# Patient Record
Sex: FEMALE | Race: WHITE | NOT HISPANIC OR LATINO | Employment: FULL TIME | URBAN - METROPOLITAN AREA
[De-identification: names, ages, dates, MRNs, and addresses within clinical notes are randomized per-mention and may not be internally consistent; named-entity substitution may affect disease eponyms.]

---

## 2023-10-27 ENCOUNTER — TELEPHONE (OUTPATIENT)
Dept: TRANSPLANT | Facility: HOSPITAL | Age: 43
End: 2023-10-27
Payer: COMMERCIAL

## 2023-10-27 NOTE — TELEPHONE ENCOUNTER
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 BRCA neg. In remission since.  Reviewed with Dr. Bella fine to start eval process.  Call placed to Bria but went straight to voice mail. Message left requesting call back.  Joaquin to schedule for virtual living donor education.

## 2023-11-03 ENCOUNTER — TELEPHONE (OUTPATIENT)
Dept: TRANSPLANT | Facility: HOSPITAL | Age: 43
End: 2023-11-03
Payer: COMMERCIAL

## 2023-11-06 ENCOUNTER — EDUCATION (OUTPATIENT)
Dept: TRANSPLANT | Facility: HOSPITAL | Age: 43
End: 2023-11-06
Payer: COMMERCIAL

## 2023-11-06 ENCOUNTER — DOCUMENTATION (OUTPATIENT)
Dept: TRANSPLANT | Facility: HOSPITAL | Age: 43
End: 2023-11-06

## 2023-11-06 DIAGNOSIS — Z52.4 DONOR OF KIDNEY FOR TRANSPLANT: Primary | ICD-10-CM

## 2023-11-06 NOTE — PROGRESS NOTES
Patient received education regarding the following topics as part of their living donor evaluation:  The evaluation process, including:  ·     Transplant team members and roles   ·     Required consultations and testing  ·     Selection criteria and suitability for donation  ·     Psychosocial and financial considerations for a successful transplant  ·     Patient responsibilities, including the necessity of adhering to a strict medical regimen  An overview of the surgical procedure   Potential medical, surgical, and psychosocial risks to transplantation, including:  ·     Wound infection  ·     Pneumonia  ·     Blood clot formation  ·     Complications with remaining kidney including kidney failure and need for dialysis or kidney transplant  ·     Arrhythmias and cardiovascular collapse  ·     Multi-organ system failure  ·     Death  ·     Depression  ·     Post-Traumatic Stress Disorder  ·     Generalized anxiety, issues of dependence, and feelings of guilt  Available alternatives to transplantation  National and Transplant [place] outcomes for one-year patient and graft-survival from the most recent [facility] program-specific report.   Donor risk factors that could affect the success of the transplant and the health of the patient, including:  ·     Donor age  ·     Donor medical and social history  ·     Condition of the organ  ·     Risk of jorge cancer, HIV, Hepatitis B, Hepatitis C, or malaria if the infection is not detectable at the time of donation  Patient's right to withdraw consent for donation at any time during the process. Patient's consent to donate willingly and without pressure (coercion) from anyone, and will not receive payment or financial reward for donating organ.  Transplants not performed in a Medicare-approved transplant center could affect the patient's ability to have immunosuppression medication paid for under Medicare part B.   Multiple listing options.     Patient was given the  opportunity to have questions answered and understands that the living donor team will be available to assist the patient throughout the entire donation process. Patient was provided a copy of the signed informed consent for living donor donation.     Signed evaluation informed consent received? 11/6/2023

## 2023-11-10 ENCOUNTER — TELEPHONE (OUTPATIENT)
Dept: TRANSPLANT | Facility: HOSPITAL | Age: 43
End: 2023-11-10
Payer: COMMERCIAL

## 2023-11-17 ENCOUNTER — LAB (OUTPATIENT)
Dept: LAB | Facility: LAB | Age: 43
End: 2023-11-17
Payer: COMMERCIAL

## 2023-11-17 DIAGNOSIS — Z52.4 DONOR OF KIDNEY FOR TRANSPLANT: ICD-10-CM

## 2023-11-17 LAB
ABO GROUP (TYPE) IN BLOOD: NORMAL
ALBUMIN (MG/24HR) IN 24 HOUR URINE: NORMAL
ALBUMIN SERPL BCP-MCNC: 4.1 G/DL (ref 3.4–5)
ALP SERPL-CCNC: 47 U/L (ref 33–110)
ALT SERPL W P-5'-P-CCNC: 9 U/L (ref 7–45)
AMPHETAMINES UR QL SCN: NORMAL
ANION GAP SERPL CALC-SCNC: 9 MMOL/L (ref 10–20)
APPEARANCE UR: CLEAR
APTT PPP: 30 SECONDS (ref 27–38)
AST SERPL W P-5'-P-CCNC: 13 U/L (ref 9–39)
BARBITURATES UR QL SCN: NORMAL
BENZODIAZ UR QL SCN: NORMAL
BILIRUB SERPL-MCNC: 0.8 MG/DL (ref 0–1.2)
BILIRUB UR STRIP.AUTO-MCNC: NEGATIVE MG/DL
BUN SERPL-MCNC: 15 MG/DL (ref 6–23)
BZE UR QL SCN: NORMAL
CALCIUM SERPL-MCNC: 8.9 MG/DL (ref 8.6–10.3)
CANNABINOIDS UR QL SCN: NORMAL
CHLORIDE SERPL-SCNC: 107 MMOL/L (ref 98–107)
CHOLEST SERPL-MCNC: 170 MG/DL (ref 0–199)
CHOLESTEROL/HDL RATIO: 2.8
CMV IGG AVIDITY SERPL IA-RTO: REACTIVE %
CO2 SERPL-SCNC: 28 MMOL/L (ref 21–32)
COLLECT DURATION TIME SPEC: 24 HRS
COLOR UR: YELLOW
CREAT 24H UR-MCNC: 90.1 MG/DL (ref 20–320)
CREAT 24H UR-MRATE: 1.4 G/24 H (ref 0.67–1.59)
CREAT CL 24H UR+SERPL-VRATE: 133 ML/MIN
CREAT SERPL-MCNC: 0.73 MG/DL (ref 0.5–1.05)
CREAT SERPL-MCNC: 0.73 MG/DL (ref 0.5–1.05)
CREAT UR-MCNC: 183 MG/DL (ref 20–320)
CREAT UR-MCNC: 183 MG/DL (ref 20–320)
EBV EA IGG SER QL: NEGATIVE
EBV NA AB SER QL: POSITIVE
EBV VCA IGG SER IA-ACNC: POSITIVE
EBV VCA IGM SER IA-ACNC: ABNORMAL
ERYTHROCYTE [DISTWIDTH] IN BLOOD BY AUTOMATED COUNT: 11.9 % (ref 11.5–14.5)
EST. AVERAGE GLUCOSE BLD GHB EST-MCNC: 91 MG/DL
FENTANYL+NORFENTANYL UR QL SCN: NORMAL
GFR SERPL CREATININE-BSD FRML MDRD: >90 ML/MIN/1.73M*2
GFR SERPL CREATININE-BSD FRML MDRD: >90 ML/MIN/1.73M*2
GLUCOSE P FAST SERPL-MCNC: 88 MG/DL (ref 74–99)
GLUCOSE SERPL-MCNC: 88 MG/DL (ref 74–99)
GLUCOSE UR STRIP.AUTO-MCNC: NEGATIVE MG/DL
HBA1C MFR BLD: 4.8 %
HBV CORE IGM SER QL: NONREACTIVE
HBV SURFACE AB SER-ACNC: 5.4 MIU/ML
HBV SURFACE AG SERPL QL IA: NONREACTIVE
HCG UR QL IA.RAPID: NEGATIVE
HCT VFR BLD AUTO: 43.6 % (ref 36–46)
HCV AB SER QL: NONREACTIVE
HDLC SERPL-MCNC: 61.4 MG/DL
HGB BLD-MCNC: 14.2 G/DL (ref 12–16)
HIV 1+2 AB+HIV1 P24 AG SERPL QL IA: NONREACTIVE
HOLD SPECIMEN: NORMAL
INR PPP: 1 (ref 0.9–1.1)
KETONES UR STRIP.AUTO-MCNC: NEGATIVE MG/DL
LDLC SERPL CALC-MCNC: 98 MG/DL
LEUKOCYTE ESTERASE UR QL STRIP.AUTO: NEGATIVE
MCH RBC QN AUTO: 31.8 PG (ref 26–34)
MCHC RBC AUTO-ENTMCNC: 32.6 G/DL (ref 32–36)
MCV RBC AUTO: 98 FL (ref 80–100)
MICROALBUMIN PANEL 24H UR: <7 MG/L
MICROALBUMIN UR-MCNC: <7 MG/L
MICROALBUMIN/CREAT UR: NORMAL MG/G{CREAT}
NITRITE UR QL STRIP.AUTO: NEGATIVE
NON HDL CHOLESTEROL: 109 MG/DL (ref 0–149)
NRBC BLD-RTO: 0 /100 WBCS (ref 0–0)
OPIATES UR QL SCN: NORMAL
OXYCODONE+OXYMORPHONE UR QL SCN: NORMAL
PCP UR QL SCN: NORMAL
PH UR STRIP.AUTO: 5 [PH]
PHOSPHATE SERPL-MCNC: 3.3 MG/DL (ref 2.5–4.9)
PLATELET # BLD AUTO: 265 X10*3/UL (ref 150–450)
POTASSIUM SERPL-SCNC: 4 MMOL/L (ref 3.5–5.3)
PROT 24H UR-MCNC: 6 MG/DL (ref 5–24)
PROT 24H UR-MRATE: 93 MG/24H (ref 0–149)
PROT SERPL-MCNC: 6.7 G/DL (ref 6.4–8.2)
PROT UR STRIP.AUTO-MCNC: NEGATIVE MG/DL
PROT UR-ACNC: 9 MG/DL (ref 5–24)
PROT/CREAT UR: 0.05 MG/MG CREAT (ref 0–0.17)
PROTHROMBIN TIME: 11.3 SECONDS (ref 9.8–12.8)
RBC # BLD AUTO: 4.46 X10*6/UL (ref 4–5.2)
RBC # UR STRIP.AUTO: NEGATIVE /UL
RH FACTOR (ANTIGEN D): NORMAL
SODIUM SERPL-SCNC: 140 MMOL/L (ref 136–145)
SP GR UR STRIP.AUTO: 1.03
SPECIMEN VOL 24H UR: 1550 ML
T PALLIDUM AB SER QL: NONREACTIVE
TRIGL SERPL-MCNC: 55 MG/DL (ref 0–149)
URATE SERPL-MCNC: 3.8 MG/DL (ref 2.3–6.7)
UROBILINOGEN UR STRIP.AUTO-MCNC: <2 MG/DL
VLDL: 11 MG/DL (ref 0–40)
WBC # BLD AUTO: 4.4 X10*3/UL (ref 4.4–11.3)

## 2023-11-17 PROCEDURE — 82570 ASSAY OF URINE CREATININE: CPT

## 2023-11-17 PROCEDURE — 82947 ASSAY GLUCOSE BLOOD QUANT: CPT

## 2023-11-17 PROCEDURE — 82610 CYSTATIN C: CPT

## 2023-11-17 PROCEDURE — 81050 URINALYSIS VOLUME MEASURE: CPT

## 2023-11-17 PROCEDURE — 82565 ASSAY OF CREATININE: CPT

## 2023-11-17 PROCEDURE — 80061 LIPID PANEL: CPT

## 2023-11-17 PROCEDURE — 83036 HEMOGLOBIN GLYCOSYLATED A1C: CPT

## 2023-11-17 PROCEDURE — 81379 HLA I TYPING COMPLETE HR: CPT | Mod: OUT | Performed by: TRANSPLANT SURGERY

## 2023-11-17 PROCEDURE — 81003 URINALYSIS AUTO W/O SCOPE: CPT

## 2023-11-17 PROCEDURE — 84156 ASSAY OF PROTEIN URINE: CPT

## 2023-11-17 PROCEDURE — 86664 EPSTEIN-BARR NUCLEAR ANTIGEN: CPT

## 2023-11-17 PROCEDURE — 86705 HEP B CORE ANTIBODY IGM: CPT

## 2023-11-17 PROCEDURE — 84100 ASSAY OF PHOSPHORUS: CPT

## 2023-11-17 PROCEDURE — 87389 HIV-1 AG W/HIV-1&-2 AB AG IA: CPT

## 2023-11-17 PROCEDURE — 86900 BLOOD TYPING SEROLOGIC ABO: CPT

## 2023-11-17 PROCEDURE — 86644 CMV ANTIBODY: CPT

## 2023-11-17 PROCEDURE — 86788 WEST NILE VIRUS AB IGM: CPT

## 2023-11-17 PROCEDURE — 85610 PROTHROMBIN TIME: CPT

## 2023-11-17 PROCEDURE — 87340 HEPATITIS B SURFACE AG IA: CPT

## 2023-11-17 PROCEDURE — 86706 HEP B SURFACE ANTIBODY: CPT

## 2023-11-17 PROCEDURE — 85027 COMPLETE CBC AUTOMATED: CPT

## 2023-11-17 PROCEDURE — 86789 WEST NILE VIRUS ANTIBODY: CPT

## 2023-11-17 PROCEDURE — 86803 HEPATITIS C AB TEST: CPT

## 2023-11-17 PROCEDURE — 86645 CMV ANTIBODY IGM: CPT

## 2023-11-17 PROCEDURE — 86481 TB AG RESPONSE T-CELL SUSP: CPT

## 2023-11-17 PROCEDURE — 82043 UR ALBUMIN QUANTITATIVE: CPT

## 2023-11-17 PROCEDURE — 86665 EPSTEIN-BARR CAPSID VCA: CPT

## 2023-11-17 PROCEDURE — 86901 BLOOD TYPING SEROLOGIC RH(D): CPT

## 2023-11-17 PROCEDURE — 85730 THROMBOPLASTIN TIME PARTIAL: CPT

## 2023-11-17 PROCEDURE — 80053 COMPREHEN METABOLIC PANEL: CPT

## 2023-11-17 PROCEDURE — 81025 URINE PREGNANCY TEST: CPT

## 2023-11-17 PROCEDURE — 36415 COLL VENOUS BLD VENIPUNCTURE: CPT

## 2023-11-17 PROCEDURE — 86780 TREPONEMA PALLIDUM: CPT

## 2023-11-17 PROCEDURE — 89240 UNLISTED MISC PATH TEST: CPT | Performed by: INTERNAL MEDICINE

## 2023-11-17 PROCEDURE — 86663 EPSTEIN-BARR ANTIBODY: CPT

## 2023-11-17 PROCEDURE — 82575 CREATININE CLEARANCE TEST: CPT

## 2023-11-17 PROCEDURE — 84550 ASSAY OF BLOOD/URIC ACID: CPT

## 2023-11-17 PROCEDURE — 80307 DRUG TEST PRSMV CHEM ANLYZR: CPT

## 2023-11-17 PROCEDURE — 82104 ALPHA-1-ANTITRYPSIN PHENO: CPT

## 2023-11-19 LAB
NIL(NEG) CONTROL SPOT COUNT: NORMAL
PANEL A SPOT COUNT: 0
PANEL B SPOT COUNT: 0
POS CONTROL SPOT COUNT: NORMAL
T-SPOT. TB INTERPRETATION: NEGATIVE
WNV IGG SER IA-ACNC: 0.29 IV
WNV IGM SER IA-ACNC: 0.01 IV

## 2023-11-20 LAB
CMV IGM SERPL-ACNC: <8 AU/ML
CYSTATIN C SERPL-MCNC: 0.7 MG/L (ref 0.5–1.2)
GFR/BSA.PRED SERPLBLD CYS-BASED-ARV: 111 ML/MIN/BSA

## 2023-11-22 LAB
A1AT PHENOTYP SERPL-IMP: NORMAL
A1AT SERPL-MCNC: 144 MG/DL (ref 90–200)
SCAN RESULT: NORMAL

## 2023-11-30 ENCOUNTER — LAB REQUISITION (OUTPATIENT)
Dept: LAB | Facility: CLINIC | Age: 43
End: 2023-11-30
Payer: COMMERCIAL

## 2023-11-30 LAB
HLA CLS I TYP PNL BLD/T DONR HIGH RES: NORMAL
HLA CLS I TYP PNL BLD/T DONR HIGH RES: NORMAL
HLA RESULTS: NORMAL
HLA RESULTS: NORMAL
HLA-DP2 QL: NORMAL
HLA-DP2 QL: NORMAL
HLA-DQB1 HIGH RES: NORMAL
HLA-DQB1 HIGH RES: NORMAL
HLA-DRB1 HIGH RES: NORMAL
HLA-DRB1 HIGH RES: NORMAL

## 2023-12-05 ENCOUNTER — TELEPHONE (OUTPATIENT)
Dept: TRANSPLANT | Facility: HOSPITAL | Age: 43
End: 2023-12-05
Payer: COMMERCIAL

## 2023-12-06 ENCOUNTER — HOSPITAL ENCOUNTER (OUTPATIENT)
Dept: RADIOLOGY | Facility: HOSPITAL | Age: 43
Discharge: HOME | End: 2023-12-06
Payer: COMMERCIAL

## 2023-12-06 ENCOUNTER — TELEPHONE (OUTPATIENT)
Dept: TRANSPLANT | Facility: HOSPITAL | Age: 43
End: 2023-12-06

## 2023-12-06 ENCOUNTER — OFFICE VISIT (OUTPATIENT)
Dept: TRANSPLANT | Facility: HOSPITAL | Age: 43
End: 2023-12-06
Payer: COMMERCIAL

## 2023-12-06 ENCOUNTER — APPOINTMENT (OUTPATIENT)
Dept: TRANSPLANT | Facility: HOSPITAL | Age: 43
End: 2023-12-06
Payer: COMMERCIAL

## 2023-12-06 ENCOUNTER — HOSPITAL ENCOUNTER (INPATIENT)
Age: 43
End: 2023-12-06
Attending: SURGERY | Admitting: SURGERY
Payer: COMMERCIAL

## 2023-12-06 ENCOUNTER — DOCUMENTATION (OUTPATIENT)
Dept: TRANSPLANT | Facility: HOSPITAL | Age: 43
End: 2023-12-06
Payer: COMMERCIAL

## 2023-12-06 ENCOUNTER — HOSPITAL ENCOUNTER (OUTPATIENT)
Dept: CARDIOLOGY | Facility: HOSPITAL | Age: 43
Discharge: HOME | End: 2023-12-06
Payer: COMMERCIAL

## 2023-12-06 ENCOUNTER — DOCUMENTATION (OUTPATIENT)
Dept: ADMINISTRATIVE | Facility: CLINIC | Age: 43
End: 2023-12-06

## 2023-12-06 VITALS
HEIGHT: 65 IN | DIASTOLIC BLOOD PRESSURE: 88 MMHG | BODY MASS INDEX: 29.49 KG/M2 | WEIGHT: 177 LBS | SYSTOLIC BLOOD PRESSURE: 129 MMHG

## 2023-12-06 DIAGNOSIS — Z00.5 ENCOUNTER FOR EVALUATION TO BE TRANSPLANT DONOR: ICD-10-CM

## 2023-12-06 DIAGNOSIS — Z52.4 DONOR OF KIDNEY FOR TRANSPLANT: Primary | ICD-10-CM

## 2023-12-06 DIAGNOSIS — Z52.4 DONOR OF KIDNEY FOR TRANSPLANT: ICD-10-CM

## 2023-12-06 DIAGNOSIS — Z00.5 ENCOUNTER FOR EXAMINATION OF POTENTIAL DONOR OF ORGAN AND TISSUE: Primary | ICD-10-CM

## 2023-12-06 PROCEDURE — 93010 ELECTROCARDIOGRAM REPORT: CPT | Performed by: INTERNAL MEDICINE

## 2023-12-06 PROCEDURE — 93005 ELECTROCARDIOGRAM TRACING: CPT

## 2023-12-06 PROCEDURE — 99214 OFFICE O/P EST MOD 30 MIN: CPT | Performed by: HOSPITALIST

## 2023-12-06 PROCEDURE — 71046 X-RAY EXAM CHEST 2 VIEWS: CPT

## 2023-12-06 PROCEDURE — 99213 OFFICE O/P EST LOW 20 MIN: CPT | Mod: 25,27

## 2023-12-06 PROCEDURE — 99203 OFFICE O/P NEW LOW 30 MIN: CPT

## 2023-12-06 PROCEDURE — 71046 X-RAY EXAM CHEST 2 VIEWS: CPT | Performed by: RADIOLOGY

## 2023-12-06 PROCEDURE — 99204 OFFICE O/P NEW MOD 45 MIN: CPT | Performed by: HOSPITALIST

## 2023-12-06 RX ORDER — MONTELUKAST SODIUM 10 MG/1
10 TABLET ORAL NIGHTLY
COMMUNITY

## 2023-12-06 ASSESSMENT — ANXIETY QUESTIONNAIRES
GAD7 TOTAL SCORE: 0
7. FEELING AFRAID AS IF SOMETHING AWFUL MIGHT HAPPEN: NOT AT ALL
3. WORRYING TOO MUCH ABOUT DIFFERENT THINGS: NOT AT ALL
4. TROUBLE RELAXING: NOT AT ALL
5. BEING SO RESTLESS THAT IT IS HARD TO SIT STILL: NOT AT ALL
2. NOT BEING ABLE TO STOP OR CONTROL WORRYING: NOT AT ALL
1. FEELING NERVOUS, ANXIOUS, OR ON EDGE: NOT AT ALL
6. BECOMING EASILY ANNOYED OR IRRITABLE: NOT AT ALL
IF YOU CHECKED OFF ANY PROBLEMS ON THIS QUESTIONNAIRE, HOW DIFFICULT HAVE THESE PROBLEMS MADE IT FOR YOU TO DO YOUR WORK, TAKE CARE OF THINGS AT HOME, OR GET ALONG WITH OTHER PEOPLE: NOT DIFFICULT AT ALL

## 2023-12-06 ASSESSMENT — PATIENT HEALTH QUESTIONNAIRE - PHQ9
6. FEELING BAD ABOUT YOURSELF - OR THAT YOU ARE A FAILURE OR HAVE LET YOURSELF OR YOUR FAMILY DOWN: NOT AT ALL
SUM OF ALL RESPONSES TO PHQ9 QUESTIONS 1 & 2: 0
9. THOUGHTS THAT YOU WOULD BE BETTER OFF DEAD, OR OF HURTING YOURSELF: NOT AT ALL
1. LITTLE INTEREST OR PLEASURE IN DOING THINGS: NOT AT ALL
5. POOR APPETITE OR OVEREATING: NOT AT ALL
8. MOVING OR SPEAKING SO SLOWLY THAT OTHER PEOPLE COULD HAVE NOTICED. OR THE OPPOSITE, BEING SO FIGETY OR RESTLESS THAT YOU HAVE BEEN MOVING AROUND A LOT MORE THAN USUAL: NOT AT ALL
3. TROUBLE FALLING OR STAYING ASLEEP OR SLEEPING TOO MUCH: NOT AT ALL
SUM OF ALL RESPONSES TO PHQ QUESTIONS 1-9: 0
2. FEELING DOWN, DEPRESSED OR HOPELESS: NOT AT ALL
7. TROUBLE CONCENTRATING ON THINGS, SUCH AS READING THE NEWSPAPER OR WATCHING TELEVISION: NOT AT ALL
4. FEELING TIRED OR HAVING LITTLE ENERGY: NOT AT ALL

## 2023-12-06 NOTE — PROGRESS NOTES
Bria came in for donor evaluation and was seen by Christopher Vasquez and Jacob. She has a significant surgical history including left  mastectomy for DCIS in 2019. Chemo and radiation were not needed. She  lost 110 lbs with diet and exercise and also had an abdominoplasty in 2019. A katelyn das followed this in 2021. Labs reviewed by nephrology and are acceptable. Will schedule for CTA.

## 2023-12-06 NOTE — SIGNIFICANT EVENT
Living donor advocate report:   I spoke with Ms. Brenner this afternoon regarding her decision to donate a kidney to her friend. We discussed the following topics:     a) evaluation and informed consent process  b) knowledge of the surgical procedure, major risks and benefits (immediate and long-term; medical and psycho-social)  c) evidence of ability to evaluate risks and benefits to both the recipient and herself  d) reasons for choosing to donate  e) voluntary nature of the donation and absence of coercion   f) follow up requirements, including benefit and need     She was also given the following information:   a) the confidential nature of the interview   b) the commitment of the donor advocate to the rights, interests, and well being of the potential donor  c) opportunities for deciding not to donate and assurance of confidentiality if she wishes to withdraw consent  d) access to the advocate team following the interview.     It is my impression that Ms. Brenner is adequately informed; her decision is voluntary.

## 2023-12-06 NOTE — PROGRESS NOTES
Transplant Nephrology Donor Evaluation        Bria Brenner is a 43 y.o. 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    History of present Illness:  -She was diagnosed with breast lump in 2019 underwent bilateral diagnostic imaging with ultrasound showing 3.2 cm irregular lesion which showed atypical apocrine adenosis and fragmented intraductal papilloma.  She underwent left partial mastectomy showing a grade 3 ductal carcinoma in situ which is ER positive and subsequently underwent left complete mastectomy with lymph node and implant reconstruction.  Pathology showed atypical ductal hyperplasia but no residual ductal carcinoma in situ.  Margins are negative.  -She actually had history of gestational diabetes and hypertension during her first pregnancy but second and third pregnancies are without any complications when she lost almost 110 pounds.  -She currently underwent abdominoplasty also in 2019.  -Her recent A1c is around 4.8.  -Denied any history of kidney stones, coronary artery disease, stroke.  She does had history of 2 miscarriages otherwise have a healthy 3 pregnancies.  -Denied any psychiatric problems.      Past Medical History : History of ductal carcinoma in situ, cholecystectomy, abdominoplasty, uterine ablation    Surgical History: As above    Family HX: Mother have no significant past medical history, father had a diabetes and triple bypass with stents, have 2 sisters siblings who have no issues.    Social Connections: Not on file            PROBLEM LIST:  Active Problems:  There are no active Hospital Problems.         ALLERGIES:  Allergies   Allergen Reactions    Benadryl Decongestant Hives    Claritin [Loratadine] Hives     "Erythromycin Hives    Zyrtec [Cetirizine] Hives            CURRENT MEDICATIONS:  Scheduled medications    Continuous medications    PRN medications         OBJECTIVE:    VITALS: Visit Vitals  BP (!) 137/93 (BP Location: Right arm)   Wt 80.3 kg (177 lb)        General: No distress   Mucosa moist   AI, AC, AF     HEENT: PEERLA  CVS: S1 S2 no murmurs  RESP:  Lungs clear to auscultation   ABDO: Soft, non-tender   Neuro: A + O x 3  Skin: No rash   Extremities: No edema       LABS:        No lab exists for component: \"SODIUM\", \"POTASSIUM\", \"CHLORIDE\", \"BICARBONATE\", \"MAGNESIUM\", \"PHOSPHOROUS\"       [unfilled]       ASSESSMENT AND PLAN:    Brai Brenner is a 43 y.o. 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  -Seems to be okay candidate for donation.  -So far labs reviewed showing adequate GFR greater than 90, creatinine clearance around 133, Cystatin C EGFR is around 111.  -Urine analysis is bland and UPC is 0.05.  -Drug screening negative.  Infectious workup so far hepatitis status negative, CMV reactive, EBV reactive, HIV, syphilis, tuberculosis negative.  -Lipid panel and liver function tests are within normal range.  -Will discuss in the committee for the candidacy and surgical and oncology clearance.        Thank you for consulting :  Jcaob Haney MD                 "

## 2023-12-06 NOTE — TELEPHONE ENCOUNTER
Spoke to pt today via the phone re donor billing.  Pt is aware she is not to be billed for txp related services.  Pt is aware if treatment were needed that would no longer be txp related.  Pt is aware if she donates she could be denied life insurance.

## 2023-12-06 NOTE — PROGRESS NOTES
Encounter    Visit Type Initial Visit Location: [facility]    Barriers to Communication / Understanding:   [] Language [] Vision [] Hearing [] Other     []  Present   Accompanied By: Pt was seen alone.    Organ For Donation: Kidney    Relationship to Recipient: Emotional     Identification Process    Describe Relationship with Recipient - Friend, Los    How was donor identified?  Pt reported she offered.    Yes Is the recipient aware of your offer    No To your knowledge are there other potential donors    [x] Donor is aware they can change their mind at any time   [x] Donor is aware the reason for the change of mind will be kept confidential   [x] Organ donation is of donor's own free will   [x] Their decision is free of inducement, coercion or other external pressures   [x] Donor is not receiving anything in exchange for their organ     Motivation:    Primary motivation to be a donor - Pt reported her primary motivation is to help her friend and his family.    Has patient been persuaded or dissuaded in any way?  No    How will donation impact your relationship?  Pt believes their relationship will stay the same.    Prior altruistic behaviors  [] None [] Other     [x] Registered Organ Donor on License [x] Blood Donor [] Bone Marrow Donor     Decision Making:    Donors process for making important decisions - Pt reported she thinks about her decision and discusses it with her .    Patient's thought process seems to be    [x] Adequate [] Idealistic [] Grandiose [] Other     Patient's insight presents as    [x] Adequate [] Denial [] Absent [] Other   Other Comments:    Health Status of Donor    PCP Venus Mitchell, CNP, [facility] Date of Last Physical July 2023  How Often Yearly  Current Health Good   Current Meds / Supplement Use Singulair   Past Surgical Experience - Pt reported having several surgeries (in chart).    ** Assessment of increased risk for CDC high disease transmission  [x]  Completed   Knowledge of Recipients Health Fair    Cause of recipients end stage organ disease - Pt reported she is unsure of specific diagnosis.    Knowledge of alternative treatments for recipient  No  Are they on dialysis Yes    Patient believes the recipient is compliant with their health and will be able to care for the donated organ Yes    Knowledge of Transplant / Donation:  [x] Patient is able to make an informed decision     [x] Patient has received education regarding medical, psychosocial and financial risks related to living donation        Patient Understand Risks of Donation  Yes Adverse findings Yes infection Yes complications   Yes death   Yes pain, discomfort and bloating   Yes potential scar Yes nerve injury  Yes alteration in kidney functioning   Yes preeclampsia  Yes fatigue    Patient has an awareness of the two possible procedures laparoscopic or open nephrectomy Yes      Patient Understands Recovery and Follow Up From Donation  Yes length of stay Yes appointments    Education:   ASHLEY SMITH education: Assoc. 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    Will patient be in a paid status during recovery Yes    Does patient have financial concerns No    Does the patient have health insurance Yes    Patient Understands Financial Risks of Donation    Yes Personal expenses  Yes Expense of travel / lodging  Yes Expense of childcare  Yes Lost wages may not be reimbursed  Yes Need for lifelong follow-up at donor's expense  Yes Future health problems experienced by the donor following donation may not be covered by the recipient's health insurance  Yes Negative impact on ability to obtain or maintain affordable life, health and disability insurance    Siblings:   2# Biological (2 sisters) # Half Siblings  # Step Siblings      Sibling #1  Name Silvia, Age 47,  Health  Good  Sibling #2  Name Any, Age 39,  Health Poor  Sibling #3   Sibling #4   Sibling #5   Sibling #6   Sibling #7     Relationship Status / Family Dynamic:    Single    Yes How long  2 years  Describe Relationship  Good    How long   Describe Relationship    When    When  In a Relationship   How Long  Describe Relationship    Spouse / SO Name Rahul  Age 54   Health   Good   Spouses Education Level SW  education : Bachelors Degree    Other Caregiver Responsibilities:  None    Children:  Yes # Biological 3 sons  # Adopted    # Step Children         Child #1 Name Manuel  Age  19  Health  Good     Lives Local  How Much Contact Daily  Comment     Child #2 Name Sascha  Age  13  Health Good     Lives Local  How Much Contact Daily     Child #3  Name Jose Miguel   Age 8     Health Good  Lives Local  How much contact Daily    Support & Recovery Plan:   Yes Adequate    Primary Support:  Name Rahul Phone [phone]  Age 53  Relationship to Patient   If employed, can they take time off work Yes   If so, is it paid time off Yes   If not, will this impact your finances No   Did they attend education classes No   Do they have other caregiver responsibilities (child or eldercare) No   Do they have their own conditions which may prevent them from providing care for you No  (Medical, psychological, physical limitations)    Are they available on short notice Yes   Are they reliable Yes   Are they responsible Yes   Are they able to understand and process new information Yes   Do they have reliable transportation or will you allow them to use your vehicle Yes   Are they currently involved in your care Yes   Comments    Secondary Support  Name Tiarra Phone [phone]  Age 46  Relationship to Patient Friend  If employed, can they take time off work Yes   If so, is it paid time off Yes   If not, will this impact your finances No   Did they attend education classes No   Do they have other  caregiver responsibilities (child or eldercare)  Do they have their own conditions which may prevent them from providing care for you No  (Medical, psychological, physical limitations)    Are they available on short notice Yes   Are they reliable Yes   Are they responsible Yes   Are they able to understand and process new information Yes   Do they have reliable transportation or will you allow them to use your vehicle Yes   Are they currently involved in your care Yes   Alternate Support   Alternate Support     Housing:  Yes Adequate Owns home  [place]  Distance to [facility] 50 minutes   Pets 3 dogs  Does Patient Feel Safe in Home Yes       Transportation:  Yes Adequate  # Licensed Drivers in the Home 3  Does Patient Drive Yes If not, why   # Reliable Vehicles 3  Does Patient use Public Transportation No  Does Patient use Medical Transportation No  Comments     Mental Health  The patient reports their mood as good.    Reported Mental Health Diagnosis  None     Family History of Mental Health Concerns Mother with Depression, Anxiety  What are patient psychosocial stressors?  Pt denied having any stressors.    Cognition:  No impairment observed / reported       Current Medications:  No  Mental Health Meds  None Rx'd by   Sleep Meds None   Rx'd by   Pain Meds None   Rx'd by     OTC Meds Tylenol, Ibuprofen  Past Medications Celexa, Xanax PRN (Both in 2011)    Counseling Currently  Pt sees Francia Page through [facility] in [place] x6 months and finds it helpful.    Has patient ever been hospitalized for mental health reasons No   Was the hospitalization voluntary  Duration   Where    When  Describe situation    Discharge Plan for Follow Up  Was Discharge Plan Completed   Referral to Transplant Psych No  Mental Health Follow Up Required No     Suicide Assessment:  History of Suicide Ideation No   Timeframe  Frequency  Frequency   Plan Created  Intent to Follow Through  Outcome      History of  Suicide Attempt No     History of Suicidal Ideation in the past 3 months No Intensity   Duration     Description of Plan      Plans thought of  Intent to Follow Through  Highest Level of Intent to Follow Through    Current Plan for Safety    Plan for Follow-Up    Patient's Reported Trauma History:  None    What are patient's coping behaviors?  Pt shares she enjoys traveling, reading, baking, playing poker, and spending time with her children.    Christianity / Spirituality Pt denied being Anabaptist or spiritual.    Attitude toward interviewer Cooperative, Appropriate    Eye Contact Patient maintained good eye contact throughout appointment    Appearance The patient was neatly groomed, appropriately dressed and adequately nourished    Affect Appropriate    Thought Process Appropriate    Is the patient's mental health stable enough to proceed with living donation process and donation Yes    Does the patient understand the psychological risks of living donation?  Yes Feelings of regret, resentment or anger   Yes Donated organ may not function in recipient  Yes Changes in body image   Yes Potential for depression, anxiety, emotional distress or grief  Yes You and / or the recipient may have complications from the surgery      Substance Use /Abuse History:  Current Tobacco User No    Patient uses   Tobacco Frequency   For How Long  Is Patient Required to Quit     Former Tobacco User No  Describe past tobacco use and date quit    Current Alcohol User Yes  Type of Alcohol Used  Beer Amount 1-2 Frequency Rarely  Pattern of Alcohol Use    Is Patient Required to Quit   Continued to use the substance despite being told the substance is affecting their health    History of problems at work, school or home due to substance use      Former Alcohol User No  Describe past alcohol use and date quit      Has patient ever gone to CD treatment No  If yes, When, Where and What type of Program  Attends AA meetings    Sponsor  Do support  people drink alcohol Yes  If yes, describe support people's use  Socially  Is alcohol kept in the home Yes  Does Patient need to sign a CD contract No    Current Illegal / Unprescribed Drug User No  Type of Illegal Drug Used   Frequency  Pattern of Drug Use    Is Patient Required to Quit     Illegal / Unprescribed Drug #2  Type of Illegal Drug Used   Frequency  Pattern of Drug Use      Continue to use the substance despite being told the substance is affecting their health    History of problems at work, school or home due to substance use      Former Illegal / Unprescribed Drug User Yes  Describe past illegal drug use and date quit  Cocaine one time, Ecstasy one time - both at age 20-20  Marijuana a few times, with last use in 2007    Has patient ever gone to CD treatment No  If yes, When, Where and What type of Program   Attends AA/NA  meetings    Is patient on a Methadone / Suboxone regiment No  Do support people use illegal drugs No  If yes, describe support people's use  Are illegal drugs kept in the home No  Does Patient need to sign a CD contract No    Illegal / Unprescribed Drug #2  Type of Illegal Drug Used   Frequency  Pattern of Drug Use    Prescription Drug Abuse:  No Has patient experienced feelings of addiction  No Has patient experienced symptoms of withdrawal  No Has patient experienced any side effects? e.g.  hallucinations or delusions    Does Patient Meet the Criteria for Alcohol Use Disorder No Diagnosis  Does Patient Meet [facility]OTC guidelines   N/A  Does Patient Meet the Criteria for Illegal Drug Use Disorder No Diagnosis  Does Patient Meet [facility]OTC guidelines   N/A    [facility]OTC Substance Relapse Risk Factors   DSM-5 Severity Factors:       Legal Issues:  Yes Arrests  DUI in April 2023  Yes  Currently probation or parole - Pt is on probation until January 2024.    detention No  When   How long   Where       Citizenship:  Yes [place] Citizen  No Green Card No Visa    Advance Directives: No  Declined  Documents    Viktoriya RAMIREZ met with pt for a living donor assessment.  Pt was pleasant and engaged.  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  She is  with three children.  Pt wishes to donate to her friend, [place].  She shared that she offered to donate.  Her goal for donation is to help her friend and his family.  Pt is aware and understands that she can change her mind at any time.  The reason for the change will be kept confidential.  Organ donation is of donors own free will.  Her decision is free of inducement, coercion or other external pressures.  Pt is not receiving anything in exchange for her organ. Pt completed CDC Assessment of increased risks for high disease transmission, and has not engaged in any behaviors in the last three months.  Pt reported an understanding of the risks of donation, financial risks, recovery, and follow up.  Pt named her , Rahul ([phone]) as her primary support and friend, Tiarra ([phone]) as her secondary support.  Supports drive, have reliable transportation, and are available on short notice.  Pt denied having a mental health diagnosis.  She shared that in 2011, she was on Celexa and Xanax PRN.  She reported having issues with her job at the time, and once that changed, she did not need psychotropic medications anymore.  Pt reported seeing a therapist, Francia Page through [facility] in [place].  She has seen her on and off for the past 6 months and finds it helpful.  Pt denied previous psychiatric hospitalizations, suicide attempts, or suicidal ideation.  PHQ-9 and SOBEIDA-7 were administered and pt scored a “0” on both which indicates none to minimal depression or anxiety.  Pt denied any past or current nicotine use.  She reported minimal alcohol use, stating she has 1-2 beers rarely.  Pt reported using Cocaine and Ecstasy one time in her early 20's and Marijuana a few times, with last use in  2007.  Pt reported having a DUI in April 2023 and is on probation until January 2024.  Pt declined Advanced Directives documents.  Pt is low psychosocial risk. There are no barriers to donation at this time.    Plan   SW will follow up with pt annually or post-donation, whichever comes first.

## 2023-12-06 NOTE — PROGRESS NOTES
Live Kidney Donor Evaluation Office Visit    Chief Complaint: Patient presents for Live Kidney Donor evaluation    History of Present Illness:  Bria Brenner is a 43 y.o. female presents for a Live Kidney Donor evaluation.    She is willingly looking forward to donating to her friend who is on dialysis since Spring this year.     She denies any prior history of smoking (or alcohol or drug use), as well as no history of kidney stones. She has a remote history of one episode of UTI that was treated.    Her surgical history includes:     8/27/2019 - left partial mastectomy showing Grade 3 DCIS which was ER+(2%). 10/1/2019 - left completion mastectomy with SLNB and implant reconstruction (smooth, silicone, PRE-PEC) Final pathology showed ADH but no residual DCIS. Margins were negative.   2019 - Abdominoplasty  8/2020 - right mastopexy for symmetry  2021 - Laparoscopic cholecystectomy     She follows-up regularly with [facility] for her Breast cancer history and undergoes yearly imaging.    Her BP today in clinic is 137/93. Denies having higher BP at home or baseline.    Donating to: Friend  ABO: O  Prior Abdominal Operations: Multiple - as above   Relevant Co-morbidities: Hypertension, no pharmacotherapy required  Prior Pregnancy: YES  BMI: 29    Review of Systems:  Cardiac: Denies chest pain, palpitations  : Normal urine output. Denies history of gross hematuria, nephrolithiasis, urinary retention, or recurrent UTIs.  Vascular: Denies personal or familial history of DVT/PE. No active claudication or non-healing LE wounds.  Functional Status: Can walk up 2 flights of stairs    Past Medical History:  Gestational DM  HTN  Intentional weight loss in past from 270 to 110lbs    Past Surgical History:  8/27/2019 - left partial mastectomy showing Grade 3 DCIS which was ER+(2%). 10/1/2019 - left completion mastectomy with SLNB and implant reconstruction (smooth, silicone, PRE-PEC) Final pathology showed  ADH but no residual DCIS. Margins were negative.   2019 - Abdominoplasty  8/2020 - right mastopexy for symmetry  2021 - Laparoscopic cholecystectomy    Social History:  Denies Etoh, Smoking or Drug use    Family History:  Mother: n/a  Father: CAD/CABG  Sibling: n/a    Physical Exam:  There were no vitals filed for this visit.    Gen: A+OX3; NAD  HEENT: PERRL, sclera anicteric, MMM  Cardiac: RRR  Chest: Normal inspiratory effort  Abdomen: S/NT/ND. Midline vertical and horizontal hip-to-hip scar noted from prior abdominoplasty  Ext: No LE edema  Vascular: Good cap refill  Psychiatric: Normal mood, affect    Assessment/Plan:    43 y.o. female presented for evaluation as a potential kidney donor to friend    - The patient is a good candidate for kidney donation.    - Good functional status    - CTA Abdomen Pelvis to delineate renal and vascular anatomy    - Repeat BP in clinic and at home to assure normotension at baseline    Transplant Education:    I had a long discussion with the patient concerning the risks of kidney donation. I specifically pointed out that the donor gets no benefit from this medical intervention and only incurs risk. We discussed the fact that they should feel comfortable removing themselves from the donation process at any time should they feel uncomfortable with donating.     The confidentiality of the donor will be maintained at all times. I reiterated that I cannot share medical information with the donor about the recipient or vice versa. I reiterated the fact that the recipient may have risk factors for a bad outcome that I cannot necessarily share with the donor. It is a federal crime to receive any compensation monetary or otherwise for donating a kidney. If we find out this is occurring, we will terminate the process.     We discussed the importance of not smoking. We discussed the risks of the surgery which include but are not limited to bleeding, infection, scarring, pain, DVT, PE,  kidney failure, organ failure, heart attack, stroke, damage to surrounding structures, obesity, fatigue, nausea/bloating, risk for small bowel obstruction, risk for development of hernias, and death.     We discussed the post-operative course both in the hospital and once they are discharged. We discussed the follow-up schedule which will be 2 weeks post-surgery with me and at 6 months 1 year and 2 years post-surgery with nephrology. We discussed the financial implications of donation including job loss and difficulties obtaining insurances. We discussed the possibility of trauma to the one remaining kidney and should this happen they may going to kidney failure. We discussed the fact that NSAID use should be avoided for the rest of their life. If they do in fact developed kidney failure, they will require dialysis. They do receive extra points to move up the  donor list should they require a kidney transplant themselves.    Further work-up includes:  Time was given to provide answers to all questions.  The donor expressed understanding and wished to proceed with donation.   The donor will be taken to committee for final decision on appropriateness once the entire evaluation is completed.    Time Attestation:  I spent 60 minutes with the patient, over 50 minutes in counseling and education as outlined above.    Jean Carlos Vasquez MD, [facility]  Transplant & Hepatobiliary Surgery

## 2023-12-12 DIAGNOSIS — Z52.4 DONOR OF KIDNEY FOR TRANSPLANT: ICD-10-CM

## 2023-12-13 LAB
ATRIAL RATE: 73 BPM
P AXIS: 55 DEGREES
P OFFSET: 198 MS
P ONSET: 142 MS
PR INTERVAL: 158 MS
Q ONSET: 221 MS
QRS COUNT: 12 BEATS
QRS DURATION: 94 MS
QT INTERVAL: 384 MS
QTC CALCULATION(BAZETT): 423 MS
QTC FREDERICIA: 410 MS
R AXIS: 42 DEGREES
T AXIS: 41 DEGREES
T OFFSET: 413 MS
VENTRICULAR RATE: 73 BPM

## 2023-12-27 ENCOUNTER — ANCILLARY PROCEDURE (OUTPATIENT)
Dept: RADIOLOGY | Facility: CLINIC | Age: 43
End: 2023-12-27
Payer: COMMERCIAL

## 2023-12-27 ENCOUNTER — APPOINTMENT (OUTPATIENT)
Dept: RADIOLOGY | Facility: CLINIC | Age: 43
End: 2023-12-27

## 2023-12-27 ENCOUNTER — APPOINTMENT (OUTPATIENT)
Dept: RADIOLOGY | Facility: CLINIC | Age: 43
End: 2023-12-27
Payer: COMMERCIAL

## 2023-12-27 DIAGNOSIS — Z52.4 DONOR OF KIDNEY FOR TRANSPLANT: ICD-10-CM

## 2023-12-27 PROCEDURE — 2550000001 HC RX 255 CONTRASTS: Performed by: SURGERY

## 2023-12-27 PROCEDURE — 74174 CTA ABD&PLVS W/CONTRAST: CPT | Performed by: RADIOLOGY

## 2023-12-27 PROCEDURE — 74174 CTA ABD&PLVS W/CONTRAST: CPT

## 2023-12-27 RX ADMIN — IOHEXOL 90 ML: 350 INJECTION, SOLUTION INTRAVENOUS at 13:16

## 2024-01-09 ENCOUNTER — COMMITTEE REVIEW (OUTPATIENT)
Dept: TRANSPLANT | Facility: HOSPITAL | Age: 44
End: 2024-01-09
Payer: COMMERCIAL

## 2024-01-09 ENCOUNTER — TELEPHONE (OUTPATIENT)
Dept: TRANSPLANT | Facility: HOSPITAL | Age: 44
End: 2024-01-09
Payer: COMMERCIAL

## 2024-01-09 ENCOUNTER — DOCUMENTATION (OUTPATIENT)
Dept: TRANSPLANT | Facility: HOSPITAL | Age: 44
End: 2024-01-09
Payer: COMMERCIAL

## 2024-01-09 DIAGNOSIS — Z52.4 DONOR OF KIDNEY FOR TRANSPLANT: Primary | ICD-10-CM

## 2024-01-09 NOTE — PROGRESS NOTES
Pharmacist Pre-Transplant Donor Screening Note     Current Outpatient Medications on File Prior to Visit   Medication Sig Dispense Refill    montelukast (Singulair) 10 mg tablet Take 1 tablet (10 mg) by mouth once daily at bedtime.       No current facility-administered medications on file prior to visit.       The patient's reported medications have been reviewed. Based on the above medication list, there are no pharmacologic contraindications to living kidney donation.    Anlia Dominguez, PharmD, BCTXP  Clinical Pharmacy Specialist - Solid Organ Transplant

## 2024-01-09 NOTE — COMMITTEE REVIEW
Presentation for Donation     Evaluation Date: 11/6/2023   Committee Review Date: 1/9/2024    Organ: Kidney    Transplant Phase: Evaluation  Transplant Status: Active    Transplant Coordinator: Chacha Franz  Transplant Surgeon:     PCP: No Assigned PCP Generic Provider, MD    Committee Review Members:  Dax Dewey MT, Demar Duckworth MD PhD   Pharmacy Anila Dominguez, PharmD    KARINA LI, [facility]   Transplant Ginna Nettles MD, Laura Buckner MD, DEEPAK FULTON, Zohaib Fiore MD, Nicholas Barclay MD, Chacha Franz RN, Kylee Biswas, RN, Kylee Diehl, RN, Ronnie Lawrence JD, Jean Carlos Vasquez MD, Mateo Oconnor, PhD, CHET NGUYEN, Kathy Riojas MD       Suitability: None    Relative Contraindications: None    Absolute Contraindications: None    Committee Review Decision: Needs Re-presentation     Committee Discussion Details: Continue Evaluation. Patient will need oncology clearance with documentation of risk of reoccurrence, 24 Hr ABP, and review of operative note from abdominoplasty.

## 2024-01-09 NOTE — TELEPHONE ENCOUNTER
Notified Bria of committee discussion. She is in agreement with getting a 24h ABPM done and we will schedule at [facility]. Also notified of need to get oncology clearance with noted risk of recurrence and risk of transmission. She said that she will ask her Oncology team to get that to us. If unable to obtain from her team we will set her up with an Oncologist at .

## 2024-01-11 ENCOUNTER — TELEPHONE (OUTPATIENT)
Dept: TRANSPLANT | Facility: HOSPITAL | Age: 44
End: 2024-01-11
Payer: COMMERCIAL

## 2024-01-16 ENCOUNTER — APPOINTMENT (OUTPATIENT)
Dept: CARDIOLOGY | Facility: CLINIC | Age: 44
End: 2024-01-16
Payer: COMMERCIAL

## 2024-01-17 ENCOUNTER — APPOINTMENT (OUTPATIENT)
Dept: CARDIOLOGY | Facility: CLINIC | Age: 44
End: 2024-01-17
Payer: COMMERCIAL

## 2024-01-22 ENCOUNTER — APPOINTMENT (OUTPATIENT)
Dept: CARDIOLOGY | Facility: CLINIC | Age: 44
End: 2024-01-22
Payer: COMMERCIAL

## 2024-01-23 ENCOUNTER — HOSPITAL ENCOUNTER (OUTPATIENT)
Dept: CARDIOLOGY | Facility: CLINIC | Age: 44
Discharge: HOME | End: 2024-01-23
Payer: COMMERCIAL

## 2024-01-23 DIAGNOSIS — Z52.4 DONOR OF KIDNEY FOR TRANSPLANT: ICD-10-CM

## 2024-01-23 PROCEDURE — 93786 AMBL BP MNTR W/SW REC ONLY: CPT

## 2024-01-23 PROCEDURE — 93790 AMBL BP MNTR W/SW I&R: CPT | Performed by: INTERNAL MEDICINE

## 2024-01-24 ENCOUNTER — HOSPITAL ENCOUNTER (OUTPATIENT)
Dept: CARDIOLOGY | Facility: CLINIC | Age: 44
Discharge: HOME | End: 2024-01-24
Payer: COMMERCIAL

## 2024-01-24 DIAGNOSIS — Z52.4 DONOR OF KIDNEY FOR TRANSPLANT: ICD-10-CM

## 2024-01-25 ENCOUNTER — TELEPHONE (OUTPATIENT)
Dept: TRANSPLANT | Facility: HOSPITAL | Age: 44
End: 2024-01-25
Payer: COMMERCIAL

## 2024-01-25 DIAGNOSIS — Z52.4 DONOR OF KIDNEY FOR TRANSPLANT: Primary | ICD-10-CM

## 2024-01-25 NOTE — TELEPHONE ENCOUNTER
Attempted to reach Bria to let her know that we are ordering an echocardiogram as diastolic blood pressure reading is borderline. A message was left for her to return my call.

## 2024-01-25 NOTE — PROCEDURES
24-hour Ambulatory Blood Pressure Monitor Report  [facility] [place]    Device: Car in the Cloud Iza ABPM 7100    Period of Monitoring: From 1/23/2024, 3:35 PM to 1/24/2023, 3:40 PM.     Successful Readings: 56 (98 %)     Indication:   Suspected white coat hypertension    Current Medications:  Current Outpatient Medications on File Prior to Encounter   Medication Sig Dispense Refill    montelukast (Singulair) 10 mg tablet Take 1 tablet (10 mg) by mouth once daily at bedtime.       No current facility-administered medications on file prior to encounter.          Findings (Please see attached report):   Average ambulatory blood pressure was 116/82 mmHg with a heart rate of 81 beats per minute.     The highest SBP and DBP was 153 at 19:23 (during treadmill exercise) and 99 at 12:20.     The lowest SBP and DBP was 98 at 23:00 and 63 at 22:40.     From 8 a.m. to 11 p.m., average blood pressure was 117/84 mmHg with average heart rate of 82 beats per minute.     From 11 p.m. to 8 a.m., average blood pressure was 112/74 mmHg with a heart rate of 74 beats per minute.     Patient went to bed at unknown and woke up at unknown.    Patient reported symptoms: None     Impression: Average 24-hour ambulatory blood pressure of 116/82 mmHg indicates that systolic blood pressure is normal and diastolic blood pressure is borderline elevated. Patient has abnormal nocturnal BP dipping. No white coat effect is seen.    Recommendation: Management per referring clinician.      Thien King MD, FACYNDEE, [facility]  Director,  Center for Cardiovascular Prevention  [facility] [place]  [facility]       Recommended standards for normal ambulatory blood pressure values which are proposed to be equivalent to clinic BP of <130/80 mmHg include: daytime BP <130/80 mg Hg, nighttime BP <110/65 mm Hg, and 24 hour BP <125/75 mm Hg.   (Maksim PK, et al.2017 High Blood Pressure Clinical Practice  Guideline, Hypertension. 2017).     The clinical criteria for white coat hypertension are defined as:   Office blood pressure =130/80 mm Hg but <160/100 mm Hg after three month trial of lifestyle modification and suspected white coat hypertension with daytime ABPM or HBPM blood pressure <130/80 mm Hg.     The clinical criteria for masked hypertension are defined as:  Office blood pressure of 120 - 129/<80 mm Hg after three month trial of lifestyle modification and suspected masked hypertension with daytime ABPM or HBPM blood pressure =130/80 mm Hg.   (Measurement of Blood Pressure in Humans, A Scientific Statement from the American Heart Association, May 2019).

## 2024-01-25 NOTE — ADDENDUM NOTE
Encounter addended by: Thien King MD on: 1/25/2024 9:25 AM   Actions taken: Clinical Note Signed, Charge Capture section accepted

## 2024-01-26 ENCOUNTER — TELEPHONE (OUTPATIENT)
Dept: TRANSPLANT | Facility: HOSPITAL | Age: 44
End: 2024-01-26
Payer: COMMERCIAL

## 2024-01-26 NOTE — TELEPHONE ENCOUNTER
Spoke with Bria and explained need for echocardiogram. She is in agreement with the plan and we will get this scheduled for her.

## 2024-02-23 ENCOUNTER — APPOINTMENT (OUTPATIENT)
Dept: CARDIOLOGY | Facility: CLINIC | Age: 44
End: 2024-02-23
Payer: COMMERCIAL

## 2024-03-07 ENCOUNTER — HOSPITAL ENCOUNTER (OUTPATIENT)
Dept: CARDIOLOGY | Facility: CLINIC | Age: 44
Discharge: HOME | End: 2024-03-07
Payer: COMMERCIAL

## 2024-03-07 DIAGNOSIS — Z52.4 DONOR OF KIDNEY FOR TRANSPLANT: ICD-10-CM

## 2024-03-07 PROCEDURE — 93306 TTE W/DOPPLER COMPLETE: CPT | Performed by: INTERNAL MEDICINE

## 2024-03-07 PROCEDURE — 93306 TTE W/DOPPLER COMPLETE: CPT

## 2024-03-08 LAB
AORTIC VALVE PEAK VELOCITY: 1.11 M/S
AV PEAK GRADIENT: 4.9 MMHG
AVA (PEAK VEL): 2.78 CM2
EJECTION FRACTION APICAL 4 CHAMBER: 55.3
EJECTION FRACTION: 56 %
LEFT ATRIUM VOLUME AREA LENGTH INDEX BSA: 18.4 ML/M2
LEFT VENTRICLE INTERNAL DIMENSION DIASTOLE: 4.2 CM (ref 3.5–6)
LEFT VENTRICULAR OUTFLOW TRACT DIAMETER: 2.05 CM
MITRAL VALVE E/A RATIO: 0.95
MITRAL VALVE E/E' RATIO: 6.1
RIGHT VENTRICLE FREE WALL PEAK S': 10 CM/S
RIGHT VENTRICLE PEAK SYSTOLIC PRESSURE: 17.4 MMHG
TRICUSPID ANNULAR PLANE SYSTOLIC EXCURSION: 1.8 CM

## 2024-03-12 ENCOUNTER — COMMITTEE REVIEW (OUTPATIENT)
Dept: TRANSPLANT | Facility: HOSPITAL | Age: 44
End: 2024-03-12
Payer: COMMERCIAL

## 2024-03-12 ENCOUNTER — TELEPHONE (OUTPATIENT)
Dept: TRANSPLANT | Facility: HOSPITAL | Age: 44
End: 2024-03-12
Payer: COMMERCIAL

## 2024-03-12 DIAGNOSIS — Z52.4 DONOR OF KIDNEY FOR TRANSPLANT: Primary | ICD-10-CM

## 2024-03-12 NOTE — TELEPHONE ENCOUNTER
Attempted to reach Bria to inform her of the committee discussion. A message was left for her to return my call.

## 2024-03-12 NOTE — LETTER
March 12, 2024    Bria Elidia  [address] [place] [address]      Dear Ms. Brenner:    Our multi-disciplinary transplant team completed a review of your medical records on 3/12/2024.  I am pleased to inform you that you will be placed on the [employer] [facility] [employer] [facility] (UNOS) waiting list for a Kidney transplant.    Our transplant program consists of surgeons and medical doctors who provide coverage 365 days a year, 24 hours a day.     If you have any questions or concerns regarding your insurance coverage or billing issues, a  is available to speak with you.     It is important to keep us updated of any major changes in your medical condition, contact information and health insurance coverage.     Please don't hesitate to contact us at Dept: [phone] with any questions or concerns. We look forward to working with you through this process.      Sincerely,      Kylee Biswas RN          The UNOS Toll-free Patient Services Line:  Your Resource for Organ Transplant Information    If you have a question regarding your own medical care, you always should call your transplant hospital first. However, for general organ transplant-related information, you should call the [employer] Organ [facility] (UNOS) toll-free patient services line at [phone].  Anyone, including potential transplant candidates, candidates, recipients, family members, friends, living donors, and donor family members, can call this number to:    Talk about organ donation, living donation, the transplant process, the donation process, and transplant policies.  Get a free patient information kit with helpful booklets, waiting list and transplant information, and a list of all transplant hospitals.  Ask questions about the Organ Procurement and Transplantation Network (OPTN) web site (http://optn.transplant.hrsa.gov/), the UNOS Web site (http://unos.org/), or the UNOS web site for living  donors and transplant recipients. (http://www.transplantliving.org/).  Learn how [facility] and the OPTN can help you.  Talk about any concerns that you may have with a transplant hospital.    [employer] is a not-for-profit organization that provides the administrative services for the national OPTN under federal contract to the Health Resources and Services Administration (HRSA), an agency under the U.S. Department of Health and Human Services (HHS).    [facility] and the OPTN are responsible for:    Providing educational material for patients, the public, and professionals.  Raising awareness of the need for donated organs and tissue.  Writing organ transplant policy with help from transplant professionals, transplant patients, transplant candidates, donor families, living donors, and the public.  Coordinating organ procurement, matching, and placement.  Collecting information about every organ transplant and donation that occurs in the [place].    Remember, you should contact your transplant hospital directly if you have questions or concerns about your own medical care including medical records, work-up progress, and test results.    [facility] is not your transplant hospital, and staff at [facility] will not be able to transfer you to your transplant hospital, so keep your transplant hospital’s phone number handy.    However, while you research your transplant needs and learn as much as you can about transplantation and donation, we welcome your call to our toll-free patient services line at [phone].      [facility] PIL Final Rev 1-

## 2024-03-12 NOTE — COMMITTEE REVIEW
Presentation for Donation     Evaluation Date: 11/6/2023   Committee Review Date: 3/12/2024    Organ: Kidney    Transplant Phase: Evaluation  Transplant Status: Active    Transplant Coordinator: Chacha Franz  Transplant Surgeon:     PCP: No Assigned PCP Generic MD Lisa    Committee Review Members:  Pharmacy Dhiraj Selby, PharmD    CHER LI, [facility]   Transplant Ginna Nettles MD, Laura Buckner MD, Valentina James MD, Zohaib Fiore MD, Chacha Franz RN, Ronnie Lawrence JD, Malu Dewey MT, Mateo Oconnor, PhD       Suitability: None    Relative Contraindications: None    Absolute Contraindications: None    Committee Review Decision: Approved     Committee Discussion Details: Consults, labs and imaging reviewed. Candidate approved for living donation. Previously determined Left Nephrectomy for donation.

## 2024-03-12 NOTE — TELEPHONE ENCOUNTER
Bria returned my call and was informed that she is a candidate for living donation. We discussed next steps of setting up a date for crossmatch between she and her recipient. Bria will speak with her recipient and let me know what date works best for them.

## 2024-03-14 ENCOUNTER — LAB (OUTPATIENT)
Dept: LAB | Facility: LAB | Age: 44
End: 2024-03-14

## 2024-03-14 DIAGNOSIS — Z52.4 DONOR OF KIDNEY FOR TRANSPLANT: ICD-10-CM

## 2024-03-14 LAB
ABO GROUP (TYPE) IN BLOOD: NORMAL
RH FACTOR (ANTIGEN D): NORMAL

## 2024-03-14 PROCEDURE — 86900 BLOOD TYPING SEROLOGIC ABO: CPT

## 2024-03-14 PROCEDURE — 86901 BLOOD TYPING SEROLOGIC RH(D): CPT

## 2024-03-21 ENCOUNTER — TELEPHONE (OUTPATIENT)
Dept: TRANSPLANT | Facility: HOSPITAL | Age: 44
End: 2024-03-21

## 2024-03-21 NOTE — TELEPHONE ENCOUNTER
After confirming her identity, spoke with Bria and let her know of the compatible crossmatch results. She expressed that she was happy to hear this news. We discussed that the next step is to schedule surgery. Bria has a vacation at the end of April and would like to plan for surgery in May 2024. I let her know that I will contact her again with date options. She expressed understanding.

## 2024-04-02 ENCOUNTER — TELEPHONE (OUTPATIENT)
Dept: TRANSPLANT | Facility: HOSPITAL | Age: 44
End: 2024-04-02

## 2024-04-02 ENCOUNTER — HOSPITAL ENCOUNTER (OUTPATIENT)
Facility: HOSPITAL | Age: 44
Setting detail: SURGERY ADMIT
End: 2024-04-02
Attending: SURGERY | Admitting: SURGERY
Payer: COMMERCIAL

## 2024-04-02 DIAGNOSIS — Z00.5 WILLING TO BE KIDNEY DONOR: Primary | ICD-10-CM

## 2024-04-02 NOTE — TELEPHONE ENCOUNTER
Offered surgery dates of either 5/9 or 5/16. Bria would like to proceed with surgery on 5/9. We discussed need for pre-op testing. She will be out of town 4/20-4/25. Will plan for pre-op visit upon her return.

## 2024-04-29 ENCOUNTER — TELEPHONE (OUTPATIENT)
Dept: TRANSPLANT | Facility: HOSPITAL | Age: 44
End: 2024-04-29
Payer: COMMERCIAL

## 2024-04-29 NOTE — TELEPHONE ENCOUNTER
Spoke with Bria to let her know that surgery needs to be delayed. She was already aware and let me know that it will be at least 6 months before we are able to move forward. Will be in touch when recipient has been cleared for surgery.

## 2024-10-17 ENCOUNTER — DOCUMENTATION (OUTPATIENT)
Dept: TRANSPLANT | Facility: HOSPITAL | Age: 44
End: 2024-10-17
Payer: SUBSIDIZED

## 2024-10-17 ENCOUNTER — PREP FOR PROCEDURE (OUTPATIENT)
Dept: TRANSPLANT | Facility: HOSPITAL | Age: 44
End: 2024-10-17
Payer: SUBSIDIZED

## 2024-10-17 DIAGNOSIS — Z52.4 KIDNEY DONOR: Primary | ICD-10-CM

## 2024-10-17 DIAGNOSIS — Z52.4 DONOR OF KIDNEY FOR TRANSPLANT: ICD-10-CM

## 2024-10-17 NOTE — PROGRESS NOTES
Spoke with Bria and let her know that we have received the okay to get a surgery date on the books. She was happy to hear this news. Bria is going to confirm with work and also her family but would like to aim for mid-December for a surgery date. She is hoping to call be back before the end of the day.

## 2024-10-18 ENCOUNTER — TELEPHONE (OUTPATIENT)
Dept: TRANSPLANT | Facility: HOSPITAL | Age: 44
End: 2024-10-18
Payer: SUBSIDIZED

## 2024-11-04 DIAGNOSIS — Z52.4 DONOR OF KIDNEY FOR TRANSPLANT: Primary | ICD-10-CM

## 2024-12-04 ENCOUNTER — OFFICE VISIT (OUTPATIENT)
Dept: TRANSPLANT | Facility: HOSPITAL | Age: 44
End: 2024-12-04
Payer: SUBSIDIZED

## 2024-12-04 ENCOUNTER — DOCUMENTATION (OUTPATIENT)
Dept: TRANSPLANT | Facility: HOSPITAL | Age: 44
End: 2024-12-04
Payer: SUBSIDIZED

## 2024-12-04 ENCOUNTER — HOSPITAL ENCOUNTER (OUTPATIENT)
Dept: RADIOLOGY | Facility: HOSPITAL | Age: 44
Discharge: HOME | End: 2024-12-04
Payer: SUBSIDIZED

## 2024-12-04 ENCOUNTER — LAB (OUTPATIENT)
Dept: LAB | Facility: HOSPITAL | Age: 44
End: 2024-12-04
Payer: SUBSIDIZED

## 2024-12-04 ENCOUNTER — HOSPITAL ENCOUNTER (OUTPATIENT)
Dept: CARDIOLOGY | Facility: HOSPITAL | Age: 44
Discharge: HOME | End: 2024-12-04
Payer: SUBSIDIZED

## 2024-12-04 VITALS
SYSTOLIC BLOOD PRESSURE: 135 MMHG | TEMPERATURE: 97.6 F | HEART RATE: 74 BPM | OXYGEN SATURATION: 98 % | WEIGHT: 183.5 LBS | BODY MASS INDEX: 30.54 KG/M2 | DIASTOLIC BLOOD PRESSURE: 89 MMHG

## 2024-12-04 DIAGNOSIS — Z52.4 DONOR OF KIDNEY FOR TRANSPLANT: ICD-10-CM

## 2024-12-04 DIAGNOSIS — Z52.4 DONOR OF KIDNEY FOR TRANSPLANT: Primary | ICD-10-CM

## 2024-12-04 LAB
ABO GROUP (TYPE) IN BLOOD: NORMAL
ALBUMIN SERPL BCP-MCNC: 4.4 G/DL (ref 3.4–5)
ALP SERPL-CCNC: 52 U/L (ref 33–110)
ALT SERPL W P-5'-P-CCNC: 32 U/L (ref 7–45)
ANION GAP SERPL CALC-SCNC: 12 MMOL/L (ref 10–20)
ANTIBODY SCREEN: NORMAL
APPEARANCE UR: CLEAR
APTT PPP: 28 SECONDS (ref 27–38)
AST SERPL W P-5'-P-CCNC: 21 U/L (ref 9–39)
BASOPHILS # BLD AUTO: 0.05 X10*3/UL (ref 0–0.1)
BASOPHILS NFR BLD AUTO: 0.8 %
BILIRUB SERPL-MCNC: 1.5 MG/DL (ref 0–1.2)
BILIRUB UR STRIP.AUTO-MCNC: NEGATIVE MG/DL
BUN SERPL-MCNC: 11 MG/DL (ref 6–23)
CALCIUM SERPL-MCNC: 9.3 MG/DL (ref 8.6–10.3)
CHLORIDE SERPL-SCNC: 107 MMOL/L (ref 98–107)
CO2 SERPL-SCNC: 24 MMOL/L (ref 21–32)
COLOR UR: NORMAL
CREAT SERPL-MCNC: 0.61 MG/DL (ref 0.5–1.05)
CREAT UR-MCNC: 68.5 MG/DL (ref 20–320)
CREAT UR-MCNC: 68.5 MG/DL (ref 20–320)
EGFRCR SERPLBLD CKD-EPI 2021: >90 ML/MIN/1.73M*2
EOSINOPHIL # BLD AUTO: 0.19 X10*3/UL (ref 0–0.7)
EOSINOPHIL NFR BLD AUTO: 3.2 %
ERYTHROCYTE [DISTWIDTH] IN BLOOD BY AUTOMATED COUNT: 12 % (ref 11.5–14.5)
GLUCOSE SERPL-MCNC: 86 MG/DL (ref 74–99)
GLUCOSE UR STRIP.AUTO-MCNC: NORMAL MG/DL
HBV CORE AB SER QL: NONREACTIVE
HBV SURFACE AG SERPL QL IA: NONREACTIVE
HCG UR QL IA.RAPID: NEGATIVE
HCT VFR BLD AUTO: 44.8 % (ref 36–46)
HCV AB SER QL: NONREACTIVE
HGB BLD-MCNC: 15.1 G/DL (ref 12–16)
HIV 1+2 AB+HIV1 P24 AG SERPL QL IA: NONREACTIVE
HOLD SPECIMEN: NORMAL
IMM GRANULOCYTES # BLD AUTO: 0.01 X10*3/UL (ref 0–0.7)
IMM GRANULOCYTES NFR BLD AUTO: 0.2 % (ref 0–0.9)
INR PPP: 1 (ref 0.9–1.1)
KETONES UR STRIP.AUTO-MCNC: NEGATIVE MG/DL
LEUKOCYTE ESTERASE UR QL STRIP.AUTO: NEGATIVE
LYMPHOCYTES # BLD AUTO: 1.69 X10*3/UL (ref 1.2–4.8)
LYMPHOCYTES NFR BLD AUTO: 28 %
MCH RBC QN AUTO: 31.3 PG (ref 26–34)
MCHC RBC AUTO-ENTMCNC: 33.7 G/DL (ref 32–36)
MCV RBC AUTO: 93 FL (ref 80–100)
MICROALBUMIN UR-MCNC: <7 MG/L
MICROALBUMIN/CREAT UR: NORMAL MG/G{CREAT}
MONOCYTES # BLD AUTO: 0.43 X10*3/UL (ref 0.1–1)
MONOCYTES NFR BLD AUTO: 7.1 %
NEUTROPHILS # BLD AUTO: 3.66 X10*3/UL (ref 1.2–7.7)
NEUTROPHILS NFR BLD AUTO: 60.7 %
NITRITE UR QL STRIP.AUTO: NEGATIVE
NRBC BLD-RTO: 0 /100 WBCS (ref 0–0)
PH UR STRIP.AUTO: 7 [PH]
PLATELET # BLD AUTO: 279 X10*3/UL (ref 150–450)
POTASSIUM SERPL-SCNC: 3.9 MMOL/L (ref 3.5–5.3)
PROT SERPL-MCNC: 6.9 G/DL (ref 6.4–8.2)
PROT UR STRIP.AUTO-MCNC: NEGATIVE MG/DL
PROT UR-ACNC: 5 MG/DL (ref 5–24)
PROT/CREAT UR: 0.07 MG/MG CREAT (ref 0–0.17)
PROTHROMBIN TIME: 10.9 SECONDS (ref 9.8–12.8)
RBC # BLD AUTO: 4.82 X10*6/UL (ref 4–5.2)
RBC # UR STRIP.AUTO: NEGATIVE /UL
RH FACTOR (ANTIGEN D): NORMAL
SODIUM SERPL-SCNC: 139 MMOL/L (ref 136–145)
SP GR UR STRIP.AUTO: 1.01
UROBILINOGEN UR STRIP.AUTO-MCNC: NORMAL MG/DL
WBC # BLD AUTO: 6 X10*3/UL (ref 4.4–11.3)

## 2024-12-04 PROCEDURE — 82043 UR ALBUMIN QUANTITATIVE: CPT

## 2024-12-04 PROCEDURE — 82570 ASSAY OF URINE CREATININE: CPT

## 2024-12-04 PROCEDURE — 85730 THROMBOPLASTIN TIME PARTIAL: CPT

## 2024-12-04 PROCEDURE — 85610 PROTHROMBIN TIME: CPT

## 2024-12-04 PROCEDURE — 93005 ELECTROCARDIOGRAM TRACING: CPT

## 2024-12-04 PROCEDURE — 85025 COMPLETE CBC W/AUTO DIFF WBC: CPT

## 2024-12-04 PROCEDURE — 81003 URINALYSIS AUTO W/O SCOPE: CPT

## 2024-12-04 PROCEDURE — 87389 HIV-1 AG W/HIV-1&-2 AB AG IA: CPT

## 2024-12-04 PROCEDURE — 87340 HEPATITIS B SURFACE AG IA: CPT

## 2024-12-04 PROCEDURE — 86901 BLOOD TYPING SEROLOGIC RH(D): CPT

## 2024-12-04 PROCEDURE — 86788 WEST NILE VIRUS AB IGM: CPT

## 2024-12-04 PROCEDURE — 36415 COLL VENOUS BLD VENIPUNCTURE: CPT

## 2024-12-04 PROCEDURE — 71046 X-RAY EXAM CHEST 2 VIEWS: CPT

## 2024-12-04 PROCEDURE — 86704 HEP B CORE ANTIBODY TOTAL: CPT

## 2024-12-04 PROCEDURE — 84075 ASSAY ALKALINE PHOSPHATASE: CPT

## 2024-12-04 PROCEDURE — 81025 URINE PREGNANCY TEST: CPT

## 2024-12-04 PROCEDURE — 86803 HEPATITIS C AB TEST: CPT

## 2024-12-04 PROCEDURE — 87801 DETECT AGNT MULT DNA AMPLI: CPT

## 2024-12-04 PROCEDURE — 99212 OFFICE O/P EST SF 10 MIN: CPT | Mod: 57,25

## 2024-12-04 ASSESSMENT — PAIN SCALES - GENERAL: PAINLEVEL_OUTOF10: 0-NO PAIN

## 2024-12-04 NOTE — PROGRESS NOTES
Live Kidney Donor Evaluation Office Visit    Chief Complaint: Patient presents in preparation for kidney transplant    History of Present Illness:  Bria Brenner is a 44 y.o. female presents for a Live Kidney Donor evaluation.    She is  donating to her friend who is on dialysis since Spring this year.     She denies any prior history of smoking (or alcohol or drug use), as well as no history of kidney stones. She has a remote history of one episode of UTI that was treated.    Her surgical history includes:     8/27/2019 - left partial mastectomy showing Grade 3 DCIS which was ER+(2%). 10/1/2019 - left completion mastectomy with SLNB and implant reconstruction (smooth, silicone, PRE-PEC) Final pathology showed ADH but no residual DCIS. Margins were negative.   2019 - Abdominoplasty  8/2020 - right mastopexy for symmetry  2021 - Laparoscopic cholecystectomy     She follows-up regularly with [facility] for her Breast cancer history and undergoes yearly imaging.    Her BP today in clinic is 137/93. Denies having higher BP at home or baseline.    Donating to: Friend  ABO: O  Prior Abdominal Operations: Multiple - as above   Relevant Co-morbidities: Hypertension, no pharmacotherapy required  Prior Pregnancy: YES  BMI: 29    Review of Systems:  Cardiac: Denies chest pain, palpitations  : Normal urine output. Denies history of gross hematuria, nephrolithiasis, urinary retention, or recurrent UTIs.  Vascular: Denies personal or familial history of DVT/PE. No active claudication or non-healing LE wounds.  Functional Status: Can walk up 2 flights of stairs    Past Medical History:  Gestational DM  HTN  Intentional weight loss in past from 270 to 110lbs    Past Surgical History:  8/27/2019 - left partial mastectomy showing Grade 3 DCIS which was ER+(2%). 10/1/2019 - left completion mastectomy with SLNB and implant reconstruction (smooth, silicone, PRE-PEC) Final pathology showed ADH but no residual DCIS.  Margins were negative.   2019 - Abdominoplasty  8/2020 - right mastopexy for symmetry  2021 - Laparoscopic cholecystectomy    Social History:  Denies Etoh, Smoking or Drug use    Family History:  Mother: n/a  Father: CAD/CABG  Sibling: n/a    Physical Exam:  Vitals:    12/04/24 1006   BP: 135/89   Pulse: 74   Temp: 36.4 °C (97.6 °F)   SpO2: 98%       Gen: A+OX3; NAD  HEENT: PERRL, sclera anicteric, MMM  Cardiac: RRR  Chest: Normal inspiratory effort  Abdomen: S/NT/ND. Midline vertical and horizontal hip-to-hip scar noted from prior abdominoplasty  Ext: No LE edema  Vascular: Good cap refill  Psychiatric: Normal mood, affect    CT images personally reviewed  2 left arteries  Kidney equal size     Assessment/Plan:    44 y.o. female who is an appropriate candidate for living donor nephrectomy.     2 left renal arteries    Transplant Education:    I had a long discussion with the patient concerning the risks of kidney donation. I specifically pointed out that the donor gets no benefit from this medical intervention and only incurs risk. We discussed the fact that they should feel comfortable removing themselves from the donation process at any time should they feel uncomfortable with donating.     The confidentiality of the donor will be maintained at all times. I reiterated that I cannot share medical information with the donor about the recipient or vice versa. I reiterated the fact that the recipient may have risk factors for a bad outcome that I cannot necessarily share with the donor. It is a federal crime to receive any compensation monetary or otherwise for donating a kidney. If we find out this is occurring, we will terminate the process.     We discussed the importance of not smoking. We discussed the risks of the surgery which include but are not limited to bleeding, infection, scarring, pain, DVT, PE, kidney failure, organ failure, heart attack, stroke, damage to surrounding structures, obesity, fatigue,  nausea/bloating, risk for small bowel obstruction, risk for development of hernias, need to convert to open procedure. and death.     We discussed the post-operative course both in the hospital and once they are discharged. We discussed the follow-up schedule which will be 2 weeks post-surgery with me and at 6 months 1 year and 2 years post-surgery with nephrology. We discussed the financial implications of donation including job loss and difficulties obtaining insurances. We discussed the possibility of trauma to the one remaining kidney and should this happen they may going to kidney failure. We discussed the fact that NSAID use should be avoided for the rest of their life. If they do in fact developed kidney failure, they will require dialysis. They do receive extra points to move up the  donor list should they require a kidney transplant themselves.    The donor expressed understanding and wished to proceed with donation.   The donor will be taken to committee for final decision on appropriateness once the entire evaluation is completed.    Time Attestation:  I spent 30 minutes with the patient, over 25 minutes in counseling and education as outlined above.    Rahul Lawler MD    Transplant & Hepatobiliary Surgery

## 2024-12-04 NOTE — PROGRESS NOTES
Bria came in this morning for her pre-op visit. We reviewed pre-op instructions and expectations during her hospital stay. Discussed post-op activity restrictions and prescriptions. Bria denied any questions. Pre-op labs, CXR and EKG completed. Will review results with the team.

## 2024-12-04 NOTE — H&P (VIEW-ONLY)
Live Kidney Donor Evaluation Office Visit    Chief Complaint: Patient presents in preparation for kidney transplant    History of Present Illness:  Bria Brenner is a 44 y.o. female presents for a Live Kidney Donor evaluation.    She is  donating to her friend who is on dialysis since Spring this year.     She denies any prior history of smoking (or alcohol or drug use), as well as no history of kidney stones. She has a remote history of one episode of UTI that was treated.    Her surgical history includes:     8/27/2019 - left partial mastectomy showing Grade 3 DCIS which was ER+(2%). 10/1/2019 - left completion mastectomy with SLNB and implant reconstruction (smooth, silicone, PRE-PEC) Final pathology showed ADH but no residual DCIS. Margins were negative.   2019 - Abdominoplasty  8/2020 - right mastopexy for symmetry  2021 - Laparoscopic cholecystectomy     She follows-up regularly with [facility] for her Breast cancer history and undergoes yearly imaging.    Her BP today in clinic is 137/93. Denies having higher BP at home or baseline.    Donating to: Friend  ABO: O  Prior Abdominal Operations: Multiple - as above   Relevant Co-morbidities: Hypertension, no pharmacotherapy required  Prior Pregnancy: YES  BMI: 29    Review of Systems:  Cardiac: Denies chest pain, palpitations  : Normal urine output. Denies history of gross hematuria, nephrolithiasis, urinary retention, or recurrent UTIs.  Vascular: Denies personal or familial history of DVT/PE. No active claudication or non-healing LE wounds.  Functional Status: Can walk up 2 flights of stairs    Past Medical History:  Gestational DM  HTN  Intentional weight loss in past from 270 to 110lbs    Past Surgical History:  8/27/2019 - left partial mastectomy showing Grade 3 DCIS which was ER+(2%). 10/1/2019 - left completion mastectomy with SLNB and implant reconstruction (smooth, silicone, PRE-PEC) Final pathology showed ADH but no residual DCIS.  Margins were negative.   2019 - Abdominoplasty  8/2020 - right mastopexy for symmetry  2021 - Laparoscopic cholecystectomy    Social History:  Denies Etoh, Smoking or Drug use    Family History:  Mother: n/a  Father: CAD/CABG  Sibling: n/a    Physical Exam:  Vitals:    12/04/24 1006   BP: 135/89   Pulse: 74   Temp: 36.4 °C (97.6 °F)   SpO2: 98%       Gen: A+OX3; NAD  HEENT: PERRL, sclera anicteric, MMM  Cardiac: RRR  Chest: Normal inspiratory effort  Abdomen: S/NT/ND. Midline vertical and horizontal hip-to-hip scar noted from prior abdominoplasty  Ext: No LE edema  Vascular: Good cap refill  Psychiatric: Normal mood, affect    CT images personally reviewed  2 left arteries  Kidney equal size     Assessment/Plan:    44 y.o. female who is an appropriate candidate for living donor nephrectomy.     2 left renal arteries    Transplant Education:    I had a long discussion with the patient concerning the risks of kidney donation. I specifically pointed out that the donor gets no benefit from this medical intervention and only incurs risk. We discussed the fact that they should feel comfortable removing themselves from the donation process at any time should they feel uncomfortable with donating.     The confidentiality of the donor will be maintained at all times. I reiterated that I cannot share medical information with the donor about the recipient or vice versa. I reiterated the fact that the recipient may have risk factors for a bad outcome that I cannot necessarily share with the donor. It is a federal crime to receive any compensation monetary or otherwise for donating a kidney. If we find out this is occurring, we will terminate the process.     We discussed the importance of not smoking. We discussed the risks of the surgery which include but are not limited to bleeding, infection, scarring, pain, DVT, PE, kidney failure, organ failure, heart attack, stroke, damage to surrounding structures, obesity, fatigue,  nausea/bloating, risk for small bowel obstruction, risk for development of hernias, need to convert to open procedure. and death.     We discussed the post-operative course both in the hospital and once they are discharged. We discussed the follow-up schedule which will be 2 weeks post-surgery with me and at 6 months 1 year and 2 years post-surgery with nephrology. We discussed the financial implications of donation including job loss and difficulties obtaining insurances. We discussed the possibility of trauma to the one remaining kidney and should this happen they may going to kidney failure. We discussed the fact that NSAID use should be avoided for the rest of their life. If they do in fact developed kidney failure, they will require dialysis. They do receive extra points to move up the  donor list should they require a kidney transplant themselves.    The donor expressed understanding and wished to proceed with donation.   The donor will be taken to committee for final decision on appropriateness once the entire evaluation is completed.    Time Attestation:  I spent 30 minutes with the patient, over 25 minutes in counseling and education as outlined above.    Rahul Lawler MD    Transplant & Hepatobiliary Surgery

## 2024-12-05 LAB
ATRIAL RATE: 76 BPM
P AXIS: 31 DEGREES
P OFFSET: 193 MS
P ONSET: 139 MS
PR INTERVAL: 160 MS
Q ONSET: 219 MS
QRS COUNT: 13 BEATS
QRS DURATION: 82 MS
QT INTERVAL: 378 MS
QTC CALCULATION(BAZETT): 425 MS
QTC FREDERICIA: 408 MS
R AXIS: 11 DEGREES
T AXIS: 25 DEGREES
T OFFSET: 408 MS
VENTRICULAR RATE: 76 BPM
WNV IGG SER IA-ACNC: 0.15 IV
WNV IGM SER IA-ACNC: 0 IV

## 2024-12-05 PROCEDURE — 93005 ELECTROCARDIOGRAM TRACING: CPT

## 2024-12-07 LAB — HIV1 RNA SERPL DONR QL PROBE AMP: NORMAL

## 2024-12-10 ENCOUNTER — COMMITTEE REVIEW (OUTPATIENT)
Dept: TRANSPLANT | Facility: HOSPITAL | Age: 44
End: 2024-12-10
Payer: SUBSIDIZED

## 2024-12-10 LAB
ATRIAL RATE: 76 BPM
P AXIS: 31 DEGREES
P OFFSET: 193 MS
P ONSET: 139 MS
PR INTERVAL: 160 MS
Q ONSET: 219 MS
QRS COUNT: 13 BEATS
QRS DURATION: 82 MS
QT INTERVAL: 378 MS
QTC CALCULATION(BAZETT): 425 MS
QTC FREDERICIA: 408 MS
R AXIS: 11 DEGREES
T AXIS: 25 DEGREES
T OFFSET: 408 MS
VENTRICULAR RATE: 76 BPM

## 2024-12-10 NOTE — COMMITTEE REVIEW
Evaluation Date: 11/6/2023   Committee Review Date: 12/10/2024    Organ: Kidney    Transplant Phase: Evaluation  Transplant Status: Active    Transplant Coordinator: Chacha Franz  Transplant Surgeon:      PCP: No Assigned PCP Generic Provider, MD    Committee Review Members:  Pharmacy Shae Zhong, PharmD   Psychology Aleyda Mina, PhD    Nguyen Healy LCSW   Transplant Rocio Daniels RN; Jeri Guillen; Kylee Biswas RN; Chacha Franz RN; Alicia Shah RN   Transplant Nephrology Ginna Nettles MD; Valentina James MD   Transplant Surgery Rahul Lawler MD; Jean Carlos Vasquez MD; Laura Buckner MD         Suitability: None    Relative Contraindications: None    Absolute Contraindications: None    Committee Review Decision: Approved     Committee Discussion Details: Pre-op labs and chest x-ray reviewed. May proceed with donor nephrectomy as planned.

## 2024-12-10 NOTE — LETTER
December 10, 2024    Bria Elidia  [address] [place] [address]      Dear Ms. Brenner:    Our multi-disciplinary transplant team completed a review of your medical records on 12/10/2024.  I am pleased to inform you that you will be placed on the [facility] [employer] [facility] ([facility]) waiting list for a Kidney transplant.    Our transplant program consists of surgeons and medical doctors who provide coverage 365 days a year, 24 hours a day.     If you have any questions or concerns regarding your insurance coverage or billing issues, a  is available to speak with you.     It is important to keep us updated of any major changes in your medical condition, contact information and health insurance coverage.     Please don't hesitate to contact us at Dept: [phone] with any questions or concerns. We look forward to working with you through this process.      Sincerely,      Chacha Franz RN          The UNOS Toll-free Patient Services Line:  Your Resource for Organ Transplant Information    If you have a question regarding your own medical care, you always should call your transplant hospital first. However, for general organ transplant-related information, you should call the [employer] [facility] [employer] [facility] (UNOS) toll-free patient services line at [phone].  Anyone, including potential transplant candidates, candidates, recipients, family members, friends, living donors, and donor family members, can call this number to:    Talk about organ donation, living donation, the transplant process, the donation process, and transplant policies.  Get a free patient information kit with helpful booklets, waiting list and transplant information, and a list of all transplant hospitals.  Ask questions about the Organ Procurement and Transplantation Network (OPTN) web site (http://optn.transplant.hrsa.gov/), the UNOS Web site (http://unos.org/), or the UNOS web site for  living donors and transplant recipients. (http://www.transplantliving.org/).  Learn how [facility] and the OPTN can help you.  Talk about any concerns that you may have with a transplant hospital.    [employer] is a not-for-profit organization that provides the administrative services for the national OPTN under federal contract to the Health Resources and Services Administration (HRSA), an agency under the U.S. Department of Health and Human Services (HHS).    [facility] and the OPTN are responsible for:    Providing educational material for patients, the public, and professionals.  Raising awareness of the need for donated organs and tissue.  Writing organ transplant policy with help from transplant professionals, transplant patients, transplant candidates, donor families, living donors, and the public.  Coordinating organ procurement, matching, and placement.  Collecting information about every organ transplant and donation that occurs in the [place].    Remember, you should contact your transplant hospital directly if you have questions or concerns about your own medical care including medical records, work-up progress, and test results.    [facility] is not your transplant hospital, and staff at [facility] will not be able to transfer you to your transplant hospital, so keep your transplant hospital’s phone number handy.    However, while you research your transplant needs and learn as much as you can about transplantation and donation, we welcome your call to our toll-free patient services line at [phone].      [facility] PIL Final Rev 1-

## 2024-12-18 ENCOUNTER — TELEPHONE (OUTPATIENT)
Dept: TRANSPLANT | Facility: HOSPITAL | Age: 44
End: 2024-12-18
Payer: COMMERCIAL

## 2024-12-18 ENCOUNTER — ANESTHESIA EVENT (OUTPATIENT)
Dept: OPERATING ROOM | Facility: HOSPITAL | Age: 44
End: 2024-12-18
Payer: SUBSIDIZED

## 2024-12-18 NOTE — TELEPHONE ENCOUNTER
In anticipation of tomorrow's living donor surgery, attempted to reach Bria to review instructions and make sure she is feeling well. A message was left for her to return my call.

## 2024-12-18 NOTE — TELEPHONE ENCOUNTER
Spoke with Bria. She said that she feels great and denied any symptoms of illness. Bria and I reviewed instructions for tomorrow and she will arrive at the admitting desk at 0530. She denied any further questions.

## 2024-12-18 NOTE — PROGRESS NOTES
Pharmacy Medication History Review    Bria Brenner is a 44 y.o. female who is planned to be admitted for Donor of kidney for transplant. Pharmacy called the patient prior to their scheduled procedure and reviewed the patient's fvgys-ko-pelucrgur medications for accuracy.    Medications ADDED:  none  Medications CHANGED:  none  Medications REMOVED:   none    Please review updated prior to admission medication list and comments regarding how patient may be taking medications differently by going to Admission tab --> Admission Orders --> Admit Orders / Review prior to admission medications.     Preferred pharmacy, last doses of medications, and allergies to be confirmed with patient by nursing the day of procedure.     Sources used to complete the med history include:  [facility]  Pharmacy dispense history  Patient interview  Chart Review  Care Everywhere     Below are additional concerns with the patient's PTA list.  Patient states they are only taking montelukast 10mg daily. There is no fill history to verify, but shows prescribed 08/12/24 per [facility]    Venus Hancock    Meds Ambulatory and Retail Services  Please reach out via Secure Chat for questions

## 2024-12-19 ENCOUNTER — HOSPITAL ENCOUNTER (INPATIENT)
Facility: HOSPITAL | Age: 44
LOS: 1 days | Discharge: HOME | End: 2024-12-20
Attending: TRANSPLANT SURGERY | Admitting: TRANSPLANT SURGERY
Payer: SUBSIDIZED

## 2024-12-19 ENCOUNTER — ANESTHESIA (OUTPATIENT)
Dept: OPERATING ROOM | Facility: HOSPITAL | Age: 44
End: 2024-12-19
Payer: SUBSIDIZED

## 2024-12-19 ENCOUNTER — DOCUMENTATION (OUTPATIENT)
Dept: TRANSPLANT | Facility: HOSPITAL | Age: 44
End: 2024-12-19
Payer: COMMERCIAL

## 2024-12-19 DIAGNOSIS — Z52.4 KIDNEY DONOR: Primary | ICD-10-CM

## 2024-12-19 DIAGNOSIS — Z52.4 DONOR OF KIDNEY FOR TRANSPLANT: ICD-10-CM

## 2024-12-19 PROBLEM — R11.2 PONV (POSTOPERATIVE NAUSEA AND VOMITING): Status: ACTIVE | Noted: 2024-12-19

## 2024-12-19 PROBLEM — Z98.890 PONV (POSTOPERATIVE NAUSEA AND VOMITING): Status: ACTIVE | Noted: 2024-12-19

## 2024-12-19 LAB
ABO GROUP (TYPE) IN BLOOD: NORMAL
ANTIBODY SCREEN: NORMAL
PREGNANCY TEST URINE, POC: NEGATIVE
RH FACTOR (ANTIGEN D): NORMAL

## 2024-12-19 PROCEDURE — A50547 PR LAP,DONOR KIDNEY REMOV,LIVING: Performed by: ANESTHESIOLOGIST ASSISTANT

## 2024-12-19 PROCEDURE — 2500000005 HC RX 250 GENERAL PHARMACY W/O HCPCS: Performed by: TRANSPLANT SURGERY

## 2024-12-19 PROCEDURE — 7100000002 HC RECOVERY ROOM TIME - EACH INCREMENTAL 1 MINUTE: Performed by: TRANSPLANT SURGERY

## 2024-12-19 PROCEDURE — 7100000001 HC RECOVERY ROOM TIME - INITIAL BASE CHARGE: Performed by: TRANSPLANT SURGERY

## 2024-12-19 PROCEDURE — 3700000001 HC GENERAL ANESTHESIA TIME - INITIAL BASE CHARGE: Performed by: TRANSPLANT SURGERY

## 2024-12-19 PROCEDURE — 2500000004 HC RX 250 GENERAL PHARMACY W/ HCPCS (ALT 636 FOR OP/ED): Performed by: ANESTHESIOLOGIST ASSISTANT

## 2024-12-19 PROCEDURE — 2500000004 HC RX 250 GENERAL PHARMACY W/ HCPCS (ALT 636 FOR OP/ED): Mod: JW | Performed by: TRANSPLANT SURGERY

## 2024-12-19 PROCEDURE — 2500000004 HC RX 250 GENERAL PHARMACY W/ HCPCS (ALT 636 FOR OP/ED)

## 2024-12-19 PROCEDURE — 64999 UNLISTED PX NERVOUS SYSTEM: CPT

## 2024-12-19 PROCEDURE — 2500000001 HC RX 250 WO HCPCS SELF ADMINISTERED DRUGS (ALT 637 FOR MEDICARE OP): Performed by: STUDENT IN AN ORGANIZED HEALTH CARE EDUCATION/TRAINING PROGRAM

## 2024-12-19 PROCEDURE — 2780000003 HC OR 278 NO HCPCS: Performed by: TRANSPLANT SURGERY

## 2024-12-19 PROCEDURE — 2500000004 HC RX 250 GENERAL PHARMACY W/ HCPCS (ALT 636 FOR OP/ED): Performed by: STUDENT IN AN ORGANIZED HEALTH CARE EDUCATION/TRAINING PROGRAM

## 2024-12-19 PROCEDURE — 1200000002 HC GENERAL ROOM WITH TELEMETRY DAILY

## 2024-12-19 PROCEDURE — 86900 BLOOD TYPING SEROLOGIC ABO: CPT | Performed by: TRANSPLANT SURGERY

## 2024-12-19 PROCEDURE — 2720000007 HC OR 272 NO HCPCS: Performed by: TRANSPLANT SURGERY

## 2024-12-19 PROCEDURE — A50547 PR LAP,DONOR KIDNEY REMOV,LIVING: Performed by: STUDENT IN AN ORGANIZED HEALTH CARE EDUCATION/TRAINING PROGRAM

## 2024-12-19 PROCEDURE — 99222 1ST HOSP IP/OBS MODERATE 55: CPT

## 2024-12-19 PROCEDURE — 7100000024 HC EXTENDED STAY RECOVERY PER MINUTE- PACU: Performed by: TRANSPLANT SURGERY

## 2024-12-19 PROCEDURE — 3600000009 HC OR TIME - EACH INCREMENTAL 1 MINUTE - PROCEDURE LEVEL FOUR: Performed by: TRANSPLANT SURGERY

## 2024-12-19 PROCEDURE — 50547 LAPARO REMOVAL DONOR KIDNEY: CPT | Performed by: TRANSPLANT SURGERY

## 2024-12-19 PROCEDURE — 36415 COLL VENOUS BLD VENIPUNCTURE: CPT | Performed by: TRANSPLANT SURGERY

## 2024-12-19 PROCEDURE — 3600000004 HC OR TIME - INITIAL BASE CHARGE - PROCEDURE LEVEL FOUR: Performed by: TRANSPLANT SURGERY

## 2024-12-19 PROCEDURE — 3700000002 HC GENERAL ANESTHESIA TIME - EACH INCREMENTAL 1 MINUTE: Performed by: TRANSPLANT SURGERY

## 2024-12-19 PROCEDURE — 81025 URINE PREGNANCY TEST: CPT | Performed by: TRANSPLANT SURGERY

## 2024-12-19 PROCEDURE — 2500000002 HC RX 250 W HCPCS SELF ADMINISTERED DRUGS (ALT 637 FOR MEDICARE OP, ALT 636 FOR OP/ED): Performed by: STUDENT IN AN ORGANIZED HEALTH CARE EDUCATION/TRAINING PROGRAM

## 2024-12-19 PROCEDURE — 2500000005 HC RX 250 GENERAL PHARMACY W/O HCPCS: Performed by: STUDENT IN AN ORGANIZED HEALTH CARE EDUCATION/TRAINING PROGRAM

## 2024-12-19 RX ORDER — SODIUM CHLORIDE 0.9 G/100ML
IRRIGANT IRRIGATION AS NEEDED
Status: DISCONTINUED | OUTPATIENT
Start: 2024-12-19 | End: 2024-12-19 | Stop reason: HOSPADM

## 2024-12-19 RX ORDER — KETOROLAC TROMETHAMINE 30 MG/ML
INJECTION, SOLUTION INTRAMUSCULAR; INTRAVENOUS AS NEEDED
Status: DISCONTINUED | OUTPATIENT
Start: 2024-12-19 | End: 2024-12-19

## 2024-12-19 RX ORDER — HYDROMORPHONE HYDROCHLORIDE 0.2 MG/ML
0.2 INJECTION INTRAMUSCULAR; INTRAVENOUS; SUBCUTANEOUS EVERY 2 HOUR PRN
Status: DISCONTINUED | OUTPATIENT
Start: 2024-12-19 | End: 2024-12-20

## 2024-12-19 RX ORDER — NALOXONE HYDROCHLORIDE 0.4 MG/ML
0.2 INJECTION, SOLUTION INTRAMUSCULAR; INTRAVENOUS; SUBCUTANEOUS EVERY 5 MIN PRN
Status: DISCONTINUED | OUTPATIENT
Start: 2024-12-19 | End: 2024-12-20 | Stop reason: HOSPADM

## 2024-12-19 RX ORDER — HEPARIN SODIUM 5000 [USP'U]/ML
INJECTION, SOLUTION INTRAVENOUS; SUBCUTANEOUS AS NEEDED
Status: DISCONTINUED | OUTPATIENT
Start: 2024-12-19 | End: 2024-12-19

## 2024-12-19 RX ORDER — SODIUM CHLORIDE 9 MG/ML
150 INJECTION, SOLUTION INTRAVENOUS CONTINUOUS
Status: DISCONTINUED | OUTPATIENT
Start: 2024-12-19 | End: 2024-12-20

## 2024-12-19 RX ORDER — SENNOSIDES 8.6 MG/1
2 TABLET ORAL 2 TIMES DAILY
Status: DISCONTINUED | OUTPATIENT
Start: 2024-12-19 | End: 2024-12-20 | Stop reason: HOSPADM

## 2024-12-19 RX ORDER — HYDROMORPHONE HYDROCHLORIDE 0.2 MG/ML
0.2 INJECTION INTRAMUSCULAR; INTRAVENOUS; SUBCUTANEOUS EVERY 5 MIN PRN
Status: DISCONTINUED | OUTPATIENT
Start: 2024-12-19 | End: 2024-12-19

## 2024-12-19 RX ORDER — MANNITOL 20 G/100ML
INJECTION, SOLUTION INTRAVENOUS AS NEEDED
Status: DISCONTINUED | OUTPATIENT
Start: 2024-12-19 | End: 2024-12-19

## 2024-12-19 RX ORDER — CEFAZOLIN 1 G/1
INJECTION, POWDER, FOR SOLUTION INTRAVENOUS AS NEEDED
Status: DISCONTINUED | OUTPATIENT
Start: 2024-12-19 | End: 2024-12-19

## 2024-12-19 RX ORDER — OXYCODONE HYDROCHLORIDE 5 MG/1
10 TABLET ORAL EVERY 4 HOURS PRN
Status: DISCONTINUED | OUTPATIENT
Start: 2024-12-19 | End: 2024-12-19

## 2024-12-19 RX ORDER — OXYCODONE HYDROCHLORIDE 5 MG/1
5 TABLET ORAL EVERY 6 HOURS PRN
Status: DISCONTINUED | OUTPATIENT
Start: 2024-12-19 | End: 2024-12-20 | Stop reason: HOSPADM

## 2024-12-19 RX ORDER — FENTANYL CITRATE 50 UG/ML
INJECTION, SOLUTION INTRAMUSCULAR; INTRAVENOUS AS NEEDED
Status: DISCONTINUED | OUTPATIENT
Start: 2024-12-19 | End: 2024-12-19

## 2024-12-19 RX ORDER — APREPITANT 40 MG/1
40 CAPSULE ORAL ONCE
Status: COMPLETED | OUTPATIENT
Start: 2024-12-19 | End: 2024-12-19

## 2024-12-19 RX ORDER — PROPOFOL 10 MG/ML
INJECTION, EMULSION INTRAVENOUS AS NEEDED
Status: DISCONTINUED | OUTPATIENT
Start: 2024-12-19 | End: 2024-12-19

## 2024-12-19 RX ORDER — ACETAMINOPHEN 325 MG/1
650 TABLET ORAL EVERY 4 HOURS PRN
Status: DISCONTINUED | OUTPATIENT
Start: 2024-12-19 | End: 2024-12-19

## 2024-12-19 RX ORDER — ROCURONIUM BROMIDE 10 MG/ML
INJECTION, SOLUTION INTRAVENOUS AS NEEDED
Status: DISCONTINUED | OUTPATIENT
Start: 2024-12-19 | End: 2024-12-19

## 2024-12-19 RX ORDER — LIDOCAINE HYDROCHLORIDE 10 MG/ML
0.1 INJECTION, SOLUTION INFILTRATION; PERINEURAL ONCE
Status: DISCONTINUED | OUTPATIENT
Start: 2024-12-19 | End: 2024-12-19

## 2024-12-19 RX ORDER — PHENYLEPHRINE HCL IN 0.9% NACL 0.4MG/10ML
SYRINGE (ML) INTRAVENOUS AS NEEDED
Status: DISCONTINUED | OUTPATIENT
Start: 2024-12-19 | End: 2024-12-19

## 2024-12-19 RX ORDER — MIDAZOLAM HYDROCHLORIDE 1 MG/ML
INJECTION INTRAMUSCULAR; INTRAVENOUS AS NEEDED
Status: DISCONTINUED | OUTPATIENT
Start: 2024-12-19 | End: 2024-12-19

## 2024-12-19 RX ORDER — FUROSEMIDE 10 MG/ML
INJECTION INTRAMUSCULAR; INTRAVENOUS AS NEEDED
Status: DISCONTINUED | OUTPATIENT
Start: 2024-12-19 | End: 2024-12-19

## 2024-12-19 RX ORDER — TRAMADOL HYDROCHLORIDE 50 MG/1
50 TABLET ORAL EVERY 6 HOURS PRN
Status: DISCONTINUED | OUTPATIENT
Start: 2024-12-19 | End: 2024-12-20 | Stop reason: HOSPADM

## 2024-12-19 RX ORDER — ONDANSETRON HYDROCHLORIDE 2 MG/ML
4 INJECTION, SOLUTION INTRAVENOUS ONCE AS NEEDED
Status: DISCONTINUED | OUTPATIENT
Start: 2024-12-19 | End: 2024-12-19

## 2024-12-19 RX ORDER — DEXMEDETOMIDINE HYDROCHLORIDE 4 UG/ML
INJECTION, SOLUTION INTRAVENOUS CONTINUOUS PRN
Status: DISCONTINUED | OUTPATIENT
Start: 2024-12-19 | End: 2024-12-19

## 2024-12-19 RX ORDER — ONDANSETRON HYDROCHLORIDE 2 MG/ML
4 INJECTION, SOLUTION INTRAVENOUS EVERY 8 HOURS PRN
Status: DISCONTINUED | OUTPATIENT
Start: 2024-12-19 | End: 2024-12-20 | Stop reason: HOSPADM

## 2024-12-19 RX ORDER — OXYCODONE HYDROCHLORIDE 5 MG/1
5 TABLET ORAL EVERY 4 HOURS PRN
Status: DISCONTINUED | OUTPATIENT
Start: 2024-12-19 | End: 2024-12-19

## 2024-12-19 RX ORDER — LIDOCAINE HCL/PF 100 MG/5ML
SYRINGE (ML) INTRAVENOUS AS NEEDED
Status: DISCONTINUED | OUTPATIENT
Start: 2024-12-19 | End: 2024-12-19

## 2024-12-19 RX ORDER — POLYETHYLENE GLYCOL 3350 17 G/17G
17 POWDER, FOR SOLUTION ORAL DAILY
Status: DISCONTINUED | OUTPATIENT
Start: 2024-12-19 | End: 2024-12-20 | Stop reason: HOSPADM

## 2024-12-19 RX ORDER — BUPIVACAINE HYDROCHLORIDE 2.5 MG/ML
INJECTION, SOLUTION EPIDURAL; INFILTRATION; INTRACAUDAL AS NEEDED
Status: DISCONTINUED | OUTPATIENT
Start: 2024-12-19 | End: 2024-12-19 | Stop reason: HOSPADM

## 2024-12-19 RX ORDER — ROPIVACAINE HYDROCHLORIDE 5 MG/ML
INJECTION, SOLUTION EPIDURAL; INFILTRATION; PERINEURAL AS NEEDED
Status: DISCONTINUED | OUTPATIENT
Start: 2024-12-19 | End: 2024-12-19

## 2024-12-19 RX ORDER — MONTELUKAST SODIUM 10 MG/1
10 TABLET ORAL NIGHTLY
Status: DISCONTINUED | OUTPATIENT
Start: 2024-12-19 | End: 2024-12-20 | Stop reason: HOSPADM

## 2024-12-19 RX ORDER — MAGNESIUM SULFATE HEPTAHYDRATE 500 MG/ML
INJECTION, SOLUTION INTRAMUSCULAR; INTRAVENOUS AS NEEDED
Status: DISCONTINUED | OUTPATIENT
Start: 2024-12-19 | End: 2024-12-19

## 2024-12-19 RX ORDER — METHOCARBAMOL 100 MG/ML
1000 INJECTION, SOLUTION INTRAMUSCULAR; INTRAVENOUS ONCE
Status: COMPLETED | OUTPATIENT
Start: 2024-12-19 | End: 2024-12-19

## 2024-12-19 RX ORDER — ONDANSETRON 4 MG/1
4 TABLET, ORALLY DISINTEGRATING ORAL EVERY 8 HOURS PRN
Status: DISCONTINUED | OUTPATIENT
Start: 2024-12-19 | End: 2024-12-20 | Stop reason: HOSPADM

## 2024-12-19 RX ORDER — HYDRALAZINE HYDROCHLORIDE 20 MG/ML
5 INJECTION INTRAMUSCULAR; INTRAVENOUS EVERY 30 MIN PRN
Status: DISCONTINUED | OUTPATIENT
Start: 2024-12-19 | End: 2024-12-19

## 2024-12-19 RX ORDER — ALBUTEROL SULFATE 0.83 MG/ML
2.5 SOLUTION RESPIRATORY (INHALATION) ONCE AS NEEDED
Status: DISCONTINUED | OUTPATIENT
Start: 2024-12-19 | End: 2024-12-19

## 2024-12-19 RX ORDER — ONDANSETRON HYDROCHLORIDE 2 MG/ML
INJECTION, SOLUTION INTRAVENOUS AS NEEDED
Status: DISCONTINUED | OUTPATIENT
Start: 2024-12-19 | End: 2024-12-19

## 2024-12-19 RX ORDER — SCOPOLAMINE 1 MG/3D
1 PATCH, EXTENDED RELEASE TRANSDERMAL ONCE
Status: DISCONTINUED | OUTPATIENT
Start: 2024-12-19 | End: 2024-12-19

## 2024-12-19 RX ORDER — ACETAMINOPHEN 325 MG/1
650 TABLET ORAL EVERY 6 HOURS SCHEDULED
Status: DISCONTINUED | OUTPATIENT
Start: 2024-12-19 | End: 2024-12-20 | Stop reason: HOSPADM

## 2024-12-19 RX ORDER — KETOROLAC TROMETHAMINE 30 MG/ML
15 INJECTION, SOLUTION INTRAMUSCULAR; INTRAVENOUS EVERY 6 HOURS SCHEDULED
Status: DISCONTINUED | OUTPATIENT
Start: 2024-12-19 | End: 2024-12-20 | Stop reason: HOSPADM

## 2024-12-19 SDOH — HEALTH STABILITY: MENTAL HEALTH: CURRENT SMOKER: 0

## 2024-12-19 ASSESSMENT — PAIN - FUNCTIONAL ASSESSMENT
PAIN_FUNCTIONAL_ASSESSMENT: 0-10
PAIN_FUNCTIONAL_ASSESSMENT: 0-10
PAIN_FUNCTIONAL_ASSESSMENT: UNABLE TO SELF-REPORT
PAIN_FUNCTIONAL_ASSESSMENT: 0-10
PAIN_FUNCTIONAL_ASSESSMENT: 0-10
PAIN_FUNCTIONAL_ASSESSMENT: UNABLE TO SELF-REPORT
PAIN_FUNCTIONAL_ASSESSMENT: UNABLE TO SELF-REPORT
PAIN_FUNCTIONAL_ASSESSMENT: 0-10
PAIN_FUNCTIONAL_ASSESSMENT: UNABLE TO SELF-REPORT
PAIN_FUNCTIONAL_ASSESSMENT: 0-10

## 2024-12-19 ASSESSMENT — PAIN SCALES - PAIN ASSESSMENT IN ADVANCED DEMENTIA (PAINAD)
BREATHING: NORMAL
TOTALSCORE: MEDICATION (SEE MAR)
BODYLANGUAGE: RELAXED

## 2024-12-19 ASSESSMENT — PAIN SCALES - GENERAL
PAINLEVEL_OUTOF10: 3
PAINLEVEL_OUTOF10: 0 - NO PAIN
PAINLEVEL_OUTOF10: 4
PAINLEVEL_OUTOF10: 3
PAINLEVEL_OUTOF10: 0 - NO PAIN
PAINLEVEL_OUTOF10: 7
PAINLEVEL_OUTOF10: 3
PAINLEVEL_OUTOF10: 3
PAINLEVEL_OUTOF10: 0 - NO PAIN
PAINLEVEL_OUTOF10: 0 - NO PAIN
PAINLEVEL_OUTOF10: 3
PAINLEVEL_OUTOF10: 0 - NO PAIN
PAINLEVEL_OUTOF10: 0 - NO PAIN

## 2024-12-19 ASSESSMENT — COGNITIVE AND FUNCTIONAL STATUS - GENERAL
DAILY ACTIVITIY SCORE: 24
MOBILITY SCORE: 24

## 2024-12-19 ASSESSMENT — PAIN DESCRIPTION - LOCATION: LOCATION: ABDOMEN

## 2024-12-19 NOTE — SIGNIFICANT EVENT
Postoperative Check Note    Subjective  Bria Brenner is a 44 y.o. female who is now POD0 s/p laparoscopic L donor nephrectomy. Postoperatively, patient feels well, and denies fevers/chills, n/v, chest pain, shortness of breath. Feels her pain is well-controlled with pain regimen. Patient seen with  at bedside in PACU, pending floor bed.    Objective  Vitals:  Visit Vitals  /70   Pulse 71   Temp 36.3 °C (97.3 °F)   Resp 10       Physical Exam:  GEN: No acute distress. Alert, awake and conversive.  HEENT: Sclera anicteric. Moist mucous membranes.  RESP: Breathing non-labored, equal chest rise. On RA.  CV: Regular rate, normotensive  GI: Abdomen soft, nondistended, nontender. Lower abdomen incision and x2 lap sites CDI without drainage, erythema, ecchymosis, fluctuance, edema.  : Voss in place with light yellow urine.  MSK: No gross deformities. Moves all extremities spontaneously.  NEURO: Alert and oriented x3.  PSYCH: Appropriate mood and affect.  SKIN: Previous midline abdominal incision scar faded pink    Most recent labs:            15.1     6.0>-----<279              44.8     139  107  11                  ----------------<86     3.9  24  0.61           ALT 32 AST 21 AlkPhos 52 tBili 1.5           Assessment  Bria Brenner is a 44 y.o. female who is now POD0 s/p laparoscopic L donor nephrectomy.  Patient is in fair condition, appropriate for postoperative course. The plan is as follows:    - CLD  - Bowel reg  - IVF  - Pain management  - Pending floor bed    Blanca Holder MD  PGY-1 General Surgery  Transplant Surgery [id number]

## 2024-12-19 NOTE — CONSULTS
Bria Brenner is a 44 y.o. year old female patient who presents for L nephrectomy with Dr Lawler on 12/19/2024. Acute Pain consulted for block for postoperative pain control.     Anticipated Postop Pain Issues -   Palliative: typically relieved with IV analgesics and regional local anesthetics  Provocative: typically with movement  Quality: typically burning and aching  Radiation: typically none  Severity: typically severe 8-10/10  Timing: typically constant    History reviewed. No pertinent past medical history.     Past Surgical History:   Procedure Laterality Date    CT ABDOMEN PELVIS ANGIOGRAM W AND/OR WO IV CONTRAST  12/27/2023    CT ABDOMEN PELVIS ANGIOGRAM W AND/OR WO IV CONTRAST 12/27/2023 [facility] [id number] CT        No family history on file.     Social History     Socioeconomic History    Marital status:      Spouse name: Not on file    Number of children: Not on file    Years of education: Not on file    Highest education level: Not on file   Occupational History    Not on file   Tobacco Use    Smoking status: Never     Passive exposure: Never    Smokeless tobacco: Never   Substance and Sexual Activity    Alcohol use: Not on file    Drug use: Not on file    Sexual activity: Not on file   Other Topics Concern    Not on file   Social History Narrative    Not on file     Social Drivers of Health     Financial Resource Strain: Low Risk  (7/18/2023)    Received from [facility]    Overall Financial Resource Strain (CARDIA)     Difficulty of Paying Living Expenses: Not hard at all   Food Insecurity: No Food Insecurity (7/18/2023)    Received from [facility]    Hunger Vital Sign     Worried About Running Out of Food in the Last Year: Never true     Ran Out of Food in the Last Year: Never true   Transportation Needs: No Transportation Needs (7/18/2023)    Received from [facility]    PRAPARE - Transportation     Lack of Transportation  (Medical): No     Lack of Transportation (Non-Medical): No   Physical Activity: Insufficiently Active (7/18/2023)    Received from [facility]    Exercise Vital Sign     Days of Exercise per Week: 3 days     Minutes of Exercise per Session: 30 min   Stress: No Stress Concern Present (7/18/2023)    Received from [facility] of [facility] - Occupational Stress Questionnaire     Feeling of Stress : Only a little   Social Connections: Moderately Isolated (7/18/2023)    Received from [facility]    Social Connection and Isolation Panel [NHANES]     Frequency of Communication with Friends and Family: More than three times a week     Frequency of Social Gatherings with Friends and Family: Once a week     Attends Gnosticist Services: Never     Active Member of Clubs or Organizations: No     Attends Club or Organization Meetings: Never     Marital Status:    Intimate Partner Violence: Not on file   Housing Stability: Low Risk  (7/18/2023)    Received from [facility]    Housing Stability Vital Sign     Unable to Pay for Housing in the Last Year: No     Number of Places Lived in the Last Year: 1     Unstable Housing in the Last Year: No        Allergies   Allergen Reactions    Benadryl Decongestant Hives    Claritin [Loratadine] Hives    Erythromycin Hives    Zyrtec [Cetirizine] Hives         Review of Systems  Gen: No fatigue, anorexia, insomnia, fever.   Eyes: No vision loss, double vision, drainage, eye pain.   ENT: No pharyngitis, dry mouth, no hearing changes or ear discharge  Cardiac: No chest pain, palpitations, syncope, near syncope.   Pulmonary: No shortness of breath, cough, hemoptysis.   Heme/lymph: No swollen glands, fever, bleeding.   GI: No abdominal pain, change in bowel habits, melena, hematemesis, hematochezia, nausea, vomiting, diarrhea.   : No discharge, dysuria, frequency,  urgency, hematuria.  Endo: No polyuria or weight loss.   Musculoskeletal: Negative for any pain or loss of ROM/weakness  Skin: No rashes or lesions  Neuro: Normal speech, no numbness or weakness. No gait difficulties  Review of systems is otherwise negative unless stated above or in history of present illness.    Physical Exam:  Constitutional:  no distress, alert and cooperative  Eyes: clear sclera  Head/Neck: No apparent injury, trachea midline  Respiratory/Thorax: Patent airways, thorax symmetric, breathing comfortably  Cardiovascular: no pitting edema  Gastrointestinal: Nondistended  Musculoskeletal: ROM intact  Extremities: no clubbing  Neurological: alert, gonzalez x4  Psychological: Appropriate affect    Results for orders placed or performed during the hospital encounter of 12/19/24 (from the past 24 hours)   POCT pregnancy, urine   Result Value Ref Range    Preg Test, Ur Negative Negative   Type And Screen   Result Value Ref Range    ABO TYPE O     Rh TYPE POS     ANTIBODY SCREEN NEG         Bria Brenner is a 44 y.o. year old female patient who presents for left nephrectomy with Dr Lawler on 12/19/2024. Acute Pain consulted for block for postoperative pain control.     Plan:    - L quadratus lumborum block performed intra-operatively on 12/19/2024.  - Pain medications per primary team  - Will see on POD1 if inpatient    Acute Pain Team  pg [phone] ph [phone].

## 2024-12-19 NOTE — BRIEF OP NOTE
Date: 2024  OR Location: [facility] OR    Name: Bria Brenner, : 1980, Age: 44 y.o., MRN: [id number], Sex: female    Diagnosis  Pre-op Diagnosis      * Donor of kidney for transplant [Z52.4] Post-op Diagnosis     * Donor of kidney for transplant [Z52.4]     Procedures  Nephrectomy Donor Laparoscopic  21170 - NE LAPAROSCOPY DONOR NEPHRECTOMY LIVING DONOR      Surgeons      * Rahul Lawler - Primary    Resident/Fellow/Other Assistant:  Surgeons and Role:     * Jean Carlos Vasquez MD - Assisting     * Latrice Del Valle MD - Resident - Assisting    Staff:   Circulator: Nuha  Scrub Person: Tona Mario Person: Chrissy  Circulator: Maria M    Anesthesia Staff: Anesthesiologist: Keshia Condon MD  C-AA: YVONNE Sloan; YVONNE Bunn  DARELL: Jaye Mauro    Procedure Summary  Anesthesia: General  ASA: ASA status not filed in the log.  Estimated Blood Loss: 25mL  Intra-op Medications:   Administrations occurring from 0645 to 1205 on 24:   Medication Name Total Dose   sodium chloride 0.9 % irrigation solution 1,000 mL   bupivacaine PF 0.25 % (Marcaine) 0.25 % (2.5 mg/mL) injection 20 mL   oxygen (O2) therapy 600 L   scopolamine (Transderm-Scop) patch 1 patch 1 patch   aprepitant (Emend) capsule 40 mg 40 mg   ceFAZolin (Ancef) vial 1 g 2 g   dexAMETHasone (Decadron) 4 mg/mL 8 mg   dexmedeTOMIDine 4 mcg/mL in 100 mL NS infusion 79.04 mcg   dexmedeTOMIDine (Precedex) bolus from bag 12 mcg   fentaNYL (Sublimaze) injection 50 mcg/mL 100 mcg   furosemide (Lasix) injection 40 mg   heparin injection 5,000 units/mL 5,000 Units   ketorolac (Toradol) injection 30 mg 15 mg   LR bolus Cannot be calculated   lidocaine (cardiac) injection 2% prefilled syringe 70 mg   magnesium sulfate 50 % injection 1 g   mannitol 20% 15 g   midazolam PF (Versed) injection 1 mg/mL 2 mg   ondansetron (Zofran) 2 mg/mL injection 4 mg   phenylephrine 40 mcg/mL syringe 10 mL 240 mcg   propofol (Diprivan) injection 10 mg/mL  614.24 mg   rocuronium (ZeMuron) 50 mg/5 mL injection 100 mg   ropivacaine PF (Naropin) 0.5 % 30 mL   sugammadex (Bridion) 200 mg/2 mL injection 200 mg              Anesthesia Record               Intraprocedure I/O Totals          Intake    Dexmedetomidine 0.00 mL    The total shown is the total volume documented since Anesthesia Start was filed.    LR bolus 2700.00 mL    Total Intake 2700 mL       Output    Urine 310 mL    Est. Blood Loss 25 mL    Total Output 335 mL       Net    Net Volume 2365 mL          Specimen: left kidney donor      Findings: Normal appearing left kidney. Two renal arteries with separate takeoffs from aorta. Normal L renal vein anatomy.     Complications:  None; patient tolerated the procedure well.     Disposition: PACU - hemodynamically stable.  Condition: stable  Specimens Collected: No specimens collected

## 2024-12-19 NOTE — ANESTHESIA PROCEDURE NOTES
Airway  Date/Time: 12/19/2024 7:31 AM  Urgency: elective    Airway not difficult    Staffing  Performed: YVONNE   Authorized by: Keshia Condon MD    Performed by: YVONNE Sloan  Patient location during procedure: OR    Indications and Patient Condition  Indications for airway management: anesthesia and airway protection  Spontaneous ventilation: present  Sedation level: deep  Preoxygenated: yes  Patient position: sniffing  MILS not maintained throughout  Mask difficulty assessment: 1 - vent by mask    Final Airway Details  Final airway type: endotracheal airway      Successful airway: ETT  Cuffed: yes   Successful intubation technique: direct laryngoscopy  Blade: Otilia  Blade size: #3  ETT size (mm): 7.0  Cormack-Lehane Classification: grade I - full view of glottis  Placement verified by: chest auscultation and capnometry   Measured from: lips  ETT to lips (cm): 22  Number of attempts at approach: 2    Additional Comments  Secured with silk tape, 1 attempt MSA1, 1 attempt CAA, easy

## 2024-12-19 NOTE — ANESTHESIA PROCEDURE NOTES
Peripheral IV  Date/Time: 12/19/2024 7:30 AM  Inserted by: Keshia Condon MD    Placement  Needle size: 20 G  Laterality: right  Location: hand  Local anesthetic: none  Site prep: alcohol  Technique: anatomical landmarks  Attempts: 1

## 2024-12-19 NOTE — ANESTHESIA POSTPROCEDURE EVALUATION
Patient: Bria Brenner    Procedure Summary       Date: 12/19/24 Room / Location: [facility] OR 15 / [facility] OR    Anesthesia Start: 0718 Anesthesia Stop:     Procedure: Nephrectomy Donor Laparoscopic (Left) Diagnosis:       Donor of kidney for transplant      (Donor of kidney for transplant [Z52.4])    Surgeons: Rahul Lawler MD Responsible Provider: Keshia Condon MD    Anesthesia Type: general ASA Status: 1            Anesthesia Type: No value filed.    Vitals Value Taken Time   /63 12/19/24 1025   Temp 36.6 12/19/24 1029   Pulse 68 12/19/24 1028   Resp 14 12/19/24 1028   SpO2 99 % 12/19/24 1028   Vitals shown include unfiled device data.    Anesthesia Post Evaluation    Patient location during evaluation: PACU  Patient participation: complete - patient participated  Level of consciousness: awake and alert  Pain management: adequate  Airway patency: patent  Cardiovascular status: acceptable  Respiratory status: acceptable  Hydration status: acceptable  Postoperative Nausea and Vomiting: none        No notable events documented.     Upon examination patient was found obtunded w/ pinpoint pupils. Patient was found have insulin pump. D/w RN to remove the pump and check BS. BS found to be 47. Glucagon given Im given  no IV access.  IV access achieved by RN and D10 to be given       George Steen MD      Upgrade to U       George Steen MD

## 2024-12-19 NOTE — PROGRESS NOTES
Pharmacist Post-Donation Note    The Clinical Transplant Pharmacist is aware that Bria Brenner has been admitted and has undergone living donor nephrectomy. A transplant pharmacist will be rounding with the multidisciplinary transplant team and making recommendations on her medication regimen.     The patient's medications have been reviewed in conjunction with the multidisciplinary transplant team. The pharmacist is in agreement with the plan of care for medications at this time. Home medications will begin when the patient is clinically stable.    Dhiraj Selby, Jesi, BCPS, BCTXP   Solid Organ Transplant Clinical Pharmacy Specialist

## 2024-12-19 NOTE — ANESTHESIA PROCEDURE NOTES
Peripheral Block    Patient location during procedure: ED  Start time: 12/19/2024 10:10 AM  End time: 12/19/2024 10:22 AM  Reason for block: at surgeon's request and post-op pain management  Staffing  Performed: resident   Authorized by: Chris Hebert MD    Performed by: Rachael Bernard MD  Preanesthetic Checklist  Completed: patient identified, IV checked, site marked, risks and benefits discussed, surgical consent, monitors and equipment checked, pre-op evaluation and timeout performed   Timeout performed at: 12/19/2024 10:09 AM  Peripheral Block  Patient position: laying flat  Prep: ChloraPrep  Patient monitoring: heart rate, continuous pulse ox and cardiac monitor  Block type: QL  Laterality: left  Injection technique: single-shot  Guidance: ultrasound guided  Infiltration strength: 0.5 %  Dose: 30 mL  Needle  Needle type: short-bevel   Needle gauge: 22 G  Needle length: 8 cm  Needle localization: ultrasound guidance     image stored in chart  Assessment  Injection assessment: negative aspiration for heme, no paresthesia on injection, incremental injection and local visualized surrounding nerve on ultrasound  Heart rate change: no  Slow fractionated injection: yes  Additional Notes  QL single shot. informed consent obtained. risks and benefits discussed. ASA monitors placed and timeout performed. Pt positioned, prepped with chlorhexidine, draped with sterile towels.     Ultrasound guidance used with visualization of the needle throughout duration of the procedure. Aspiration was negative. A total of ropi 0.5 % 30 ml, precedex 4 mcg , dexamethasone 4 mg, epinephrine 1/200,000 was injected on left/side

## 2024-12-19 NOTE — PROGRESS NOTES
Pt is kidney donor for [place] [id number] & is not to be billed for txp related services. Recipient has MMO & is accessing [employer].  Updated auth/cert screen in [place].

## 2024-12-19 NOTE — ANESTHESIA PREPROCEDURE EVALUATION
Patient: Bria Brenner    Procedure Information       Anesthesia Start Date/Time: 12/19/24 0718    Procedure: Nephrectomy Donor Laparoscopic (Left)    Location: [facility] OR 15 / [facility] OR    Surgeons: Rahul Lawler MD            Relevant Problems   Anesthesia   (+) PONV (postoperative nausea and vomiting)      Genitourinary and Reproductive   (+) Kidney donor       Clinical information reviewed:   Tobacco  Allergies  Meds   Med Hx  Surg Hx  OB Status  Fam Hx  Soc   Hx        NPO Detail:  NPO/Void Status  Date of Last Liquid: 12/19/24  Time of Last Liquid: 0000  Date of Last Solid: 12/19/24  Time of Last Solid: 0000  Time of Last Void: 0557         Physical Exam    Airway  Mallampati: II  TM distance: >3 FB  Neck ROM: full     Cardiovascular   Rhythm: regular  Rate: normal     Dental    Pulmonary - normal exam     Abdominal            Anesthesia Plan    History of general anesthesia?: yes  History of complications of general anesthesia?: no    ASA 1     general     The patient is not a current smoker.    intravenous induction   Postoperative administration of opioids is intended.  Trial extubation is planned.  Anesthetic plan and risks discussed with patient.  Use of blood products discussed with patient who consented to blood products.    Plan discussed with CAA.

## 2024-12-19 NOTE — ANESTHESIA POSTPROCEDURE EVALUATION
Patient: Bria Brenner    Procedure Summary       Date: 12/19/24 Room / Location: [facility] OR 15 / [facility] OR    Anesthesia Start: 0718 Anesthesia Stop: 1031    Procedure: Nephrectomy Donor Laparoscopic (Left) Diagnosis:       Donor of kidney for transplant      (Donor of kidney for transplant [Z52.4])    Surgeons: Rahul Lawler MD Responsible Provider: Keshia Condon MD    Anesthesia Type: general ASA Status: 1            Anesthesia Type: general    Vitals Value Taken Time   /63 12/19/24 1025   Temp 36.6 12/19/24 1032   Pulse 72 12/19/24 1030   Resp 14 12/19/24 1030   SpO2 98 % 12/19/24 1030   Vitals shown include unfiled device data.    Anesthesia Post Evaluation    Patient location during evaluation: PACU  Patient participation: complete - patient participated  Level of consciousness: awake  Pain scale: see RN record.  Pain management: adequate  Airway patency: patent  Cardiovascular status: acceptable  Respiratory status: acceptable and face mask  Hydration status: acceptable  Postoperative Nausea and Vomiting: none        No notable events documented.

## 2024-12-20 ENCOUNTER — PHARMACY VISIT (OUTPATIENT)
Dept: PHARMACY | Facility: CLINIC | Age: 44
End: 2024-12-20
Payer: COMMERCIAL

## 2024-12-20 ENCOUNTER — DOCUMENTATION (OUTPATIENT)
Dept: TRANSPLANT | Facility: HOSPITAL | Age: 44
End: 2024-12-20
Payer: COMMERCIAL

## 2024-12-20 VITALS
BODY MASS INDEX: 31.18 KG/M2 | HEART RATE: 68 BPM | RESPIRATION RATE: 18 BRPM | HEIGHT: 65 IN | OXYGEN SATURATION: 97 % | SYSTOLIC BLOOD PRESSURE: 109 MMHG | DIASTOLIC BLOOD PRESSURE: 71 MMHG | WEIGHT: 187.17 LBS | TEMPERATURE: 97.5 F

## 2024-12-20 LAB
ALBUMIN SERPL BCP-MCNC: 3.9 G/DL (ref 3.4–5)
ANION GAP SERPL CALC-SCNC: 13 MMOL/L (ref 10–20)
ANION GAP SERPL CALC-SCNC: 9 MMOL/L (ref 10–20)
BASOPHILS # BLD AUTO: 0.02 X10*3/UL (ref 0–0.1)
BASOPHILS NFR BLD AUTO: 0.3 %
BUN SERPL-MCNC: 12 MG/DL (ref 6–23)
BUN SERPL-MCNC: 16 MG/DL (ref 6–23)
CALCIUM SERPL-MCNC: 6 MG/DL (ref 8.6–10.6)
CALCIUM SERPL-MCNC: 8.9 MG/DL (ref 8.6–10.6)
CHLORIDE SERPL-SCNC: 106 MMOL/L (ref 98–107)
CHLORIDE SERPL-SCNC: 118 MMOL/L (ref 98–107)
CO2 SERPL-SCNC: 19 MMOL/L (ref 21–32)
CO2 SERPL-SCNC: 24 MMOL/L (ref 21–32)
CREAT SERPL-MCNC: 0.83 MG/DL (ref 0.5–1.05)
CREAT SERPL-MCNC: 1.15 MG/DL (ref 0.5–1.05)
EGFRCR SERPLBLD CKD-EPI 2021: 60 ML/MIN/1.73M*2
EGFRCR SERPLBLD CKD-EPI 2021: 89 ML/MIN/1.73M*2
EOSINOPHIL # BLD AUTO: 0.01 X10*3/UL (ref 0–0.7)
EOSINOPHIL NFR BLD AUTO: 0.1 %
ERYTHROCYTE [DISTWIDTH] IN BLOOD BY AUTOMATED COUNT: 11.7 % (ref 11.5–14.5)
ERYTHROCYTE [DISTWIDTH] IN BLOOD BY AUTOMATED COUNT: 11.9 % (ref 11.5–14.5)
GLUCOSE SERPL-MCNC: 64 MG/DL (ref 74–99)
GLUCOSE SERPL-MCNC: 81 MG/DL (ref 74–99)
HCT VFR BLD AUTO: 30.2 % (ref 36–46)
HCT VFR BLD AUTO: 40 % (ref 36–46)
HGB BLD-MCNC: 13.9 G/DL (ref 12–16)
HGB BLD-MCNC: 9.8 G/DL (ref 12–16)
IMM GRANULOCYTES # BLD AUTO: 0.03 X10*3/UL (ref 0–0.7)
IMM GRANULOCYTES NFR BLD AUTO: 0.4 % (ref 0–0.9)
LYMPHOCYTES # BLD AUTO: 1.43 X10*3/UL (ref 1.2–4.8)
LYMPHOCYTES NFR BLD AUTO: 19.2 %
MAGNESIUM SERPL-MCNC: 1.54 MG/DL (ref 1.6–2.4)
MAGNESIUM SERPL-MCNC: 2.09 MG/DL (ref 1.6–2.4)
MCH RBC QN AUTO: 31.7 PG (ref 26–34)
MCH RBC QN AUTO: 31.7 PG (ref 26–34)
MCHC RBC AUTO-ENTMCNC: 32.5 G/DL (ref 32–36)
MCHC RBC AUTO-ENTMCNC: 34.8 G/DL (ref 32–36)
MCV RBC AUTO: 91 FL (ref 80–100)
MCV RBC AUTO: 98 FL (ref 80–100)
MONOCYTES # BLD AUTO: 0.66 X10*3/UL (ref 0.1–1)
MONOCYTES NFR BLD AUTO: 8.9 %
NEUTROPHILS # BLD AUTO: 5.28 X10*3/UL (ref 1.2–7.7)
NEUTROPHILS NFR BLD AUTO: 71.1 %
NRBC BLD-RTO: 0 /100 WBCS (ref 0–0)
NRBC BLD-RTO: 0 /100 WBCS (ref 0–0)
PHOSPHATE SERPL-MCNC: 2.5 MG/DL (ref 2.5–4.9)
PHOSPHATE SERPL-MCNC: 2.6 MG/DL (ref 2.5–4.9)
PLATELET # BLD AUTO: 189 X10*3/UL (ref 150–450)
PLATELET # BLD AUTO: 262 X10*3/UL (ref 150–450)
POTASSIUM SERPL-SCNC: 2.9 MMOL/L (ref 3.5–5.3)
POTASSIUM SERPL-SCNC: 3.8 MMOL/L (ref 3.5–5.3)
RBC # BLD AUTO: 3.09 X10*6/UL (ref 4–5.2)
RBC # BLD AUTO: 4.38 X10*6/UL (ref 4–5.2)
SODIUM SERPL-SCNC: 139 MMOL/L (ref 136–145)
SODIUM SERPL-SCNC: 143 MMOL/L (ref 136–145)
WBC # BLD AUTO: 10.2 X10*3/UL (ref 4.4–11.3)
WBC # BLD AUTO: 7.4 X10*3/UL (ref 4.4–11.3)

## 2024-12-20 PROCEDURE — 2500000001 HC RX 250 WO HCPCS SELF ADMINISTERED DRUGS (ALT 637 FOR MEDICARE OP): Performed by: STUDENT IN AN ORGANIZED HEALTH CARE EDUCATION/TRAINING PROGRAM

## 2024-12-20 PROCEDURE — 83735 ASSAY OF MAGNESIUM: CPT

## 2024-12-20 PROCEDURE — 99221 1ST HOSP IP/OBS SF/LOW 40: CPT | Performed by: HOSPITALIST

## 2024-12-20 PROCEDURE — 36415 COLL VENOUS BLD VENIPUNCTURE: CPT | Performed by: STUDENT IN AN ORGANIZED HEALTH CARE EDUCATION/TRAINING PROGRAM

## 2024-12-20 PROCEDURE — 83735 ASSAY OF MAGNESIUM: CPT | Performed by: STUDENT IN AN ORGANIZED HEALTH CARE EDUCATION/TRAINING PROGRAM

## 2024-12-20 PROCEDURE — 85027 COMPLETE CBC AUTOMATED: CPT | Performed by: STUDENT IN AN ORGANIZED HEALTH CARE EDUCATION/TRAINING PROGRAM

## 2024-12-20 PROCEDURE — 2500000004 HC RX 250 GENERAL PHARMACY W/ HCPCS (ALT 636 FOR OP/ED): Performed by: STUDENT IN AN ORGANIZED HEALTH CARE EDUCATION/TRAINING PROGRAM

## 2024-12-20 PROCEDURE — 84100 ASSAY OF PHOSPHORUS: CPT

## 2024-12-20 PROCEDURE — 80048 BASIC METABOLIC PNL TOTAL CA: CPT | Performed by: STUDENT IN AN ORGANIZED HEALTH CARE EDUCATION/TRAINING PROGRAM

## 2024-12-20 PROCEDURE — 85025 COMPLETE CBC W/AUTO DIFF WBC: CPT | Performed by: STUDENT IN AN ORGANIZED HEALTH CARE EDUCATION/TRAINING PROGRAM

## 2024-12-20 PROCEDURE — 99231 SBSQ HOSP IP/OBS SF/LOW 25: CPT

## 2024-12-20 PROCEDURE — RXMED WILLOW AMBULATORY MEDICATION CHARGE

## 2024-12-20 PROCEDURE — 80048 BASIC METABOLIC PNL TOTAL CA: CPT | Mod: CCI | Performed by: STUDENT IN AN ORGANIZED HEALTH CARE EDUCATION/TRAINING PROGRAM

## 2024-12-20 RX ORDER — MAGNESIUM SULFATE HEPTAHYDRATE 40 MG/ML
2 INJECTION, SOLUTION INTRAVENOUS ONCE
Status: DISCONTINUED | OUTPATIENT
Start: 2024-12-20 | End: 2024-12-20

## 2024-12-20 RX ORDER — POTASSIUM CHLORIDE 1.5 G/1.58G
40 POWDER, FOR SOLUTION ORAL ONCE
Status: DISCONTINUED | OUTPATIENT
Start: 2024-12-20 | End: 2024-12-20

## 2024-12-20 RX ORDER — POTASSIUM CHLORIDE 14.9 MG/ML
20 INJECTION INTRAVENOUS
Status: DISCONTINUED | OUTPATIENT
Start: 2024-12-20 | End: 2024-12-20

## 2024-12-20 RX ORDER — DOCUSATE SODIUM 100 MG/1
100 CAPSULE, LIQUID FILLED ORAL 2 TIMES DAILY PRN
Qty: 30 CAPSULE | Refills: 0 | Status: SHIPPED | OUTPATIENT
Start: 2024-12-20 | End: 2025-01-04

## 2024-12-20 RX ORDER — POLYETHYLENE GLYCOL 3350 17 G/17G
17 POWDER, FOR SOLUTION ORAL DAILY PRN
Qty: 5 PACKET | Refills: 0 | Status: SHIPPED | OUTPATIENT
Start: 2024-12-20 | End: 2024-12-25

## 2024-12-20 RX ORDER — ACETAMINOPHEN 325 MG/1
650 TABLET ORAL EVERY 6 HOURS SCHEDULED
Qty: 30 TABLET | Refills: 0 | Status: SHIPPED | OUTPATIENT
Start: 2024-12-20 | End: 2024-12-24

## 2024-12-20 RX ORDER — TRAMADOL HYDROCHLORIDE 50 MG/1
50 TABLET ORAL EVERY 6 HOURS PRN
Qty: 15 TABLET | Refills: 0 | Status: SHIPPED | OUTPATIENT
Start: 2024-12-20 | End: 2024-12-25

## 2024-12-20 SDOH — SOCIAL STABILITY: SOCIAL INSECURITY: HAVE YOU HAD ANY THOUGHTS OF HARMING ANYONE ELSE?: NO

## 2024-12-20 SDOH — ECONOMIC STABILITY: HOUSING INSECURITY: AT ANY TIME IN THE PAST 12 MONTHS, WERE YOU HOMELESS OR LIVING IN A SHELTER (INCLUDING NOW)?: NO

## 2024-12-20 SDOH — SOCIAL STABILITY: SOCIAL INSECURITY: ARE THERE ANY APPARENT SIGNS OF INJURIES/BEHAVIORS THAT COULD BE RELATED TO ABUSE/NEGLECT?: NO

## 2024-12-20 SDOH — ECONOMIC STABILITY: FOOD INSECURITY: WITHIN THE PAST 12 MONTHS, THE FOOD YOU BOUGHT JUST DIDN'T LAST AND YOU DIDN'T HAVE MONEY TO GET MORE.: NEVER TRUE

## 2024-12-20 SDOH — SOCIAL STABILITY: SOCIAL INSECURITY
ASK PARENT OR GUARDIAN: ARE THERE TIMES WHEN YOU, YOUR CHILD(REN), OR ANY MEMBER OF YOUR HOUSEHOLD FEEL UNSAFE, HARMED, OR THREATENED AROUND PERSONS WITH WHOM YOU KNOW OR LIVE?: NO

## 2024-12-20 SDOH — SOCIAL STABILITY: SOCIAL INSECURITY: WITHIN THE LAST YEAR, HAVE YOU BEEN HUMILIATED OR EMOTIONALLY ABUSED IN OTHER WAYS BY YOUR PARTNER OR EX-PARTNER?: NO

## 2024-12-20 SDOH — SOCIAL STABILITY: SOCIAL INSECURITY: WERE YOU ABLE TO COMPLETE ALL THE BEHAVIORAL HEALTH SCREENINGS?: YES

## 2024-12-20 SDOH — SOCIAL STABILITY: SOCIAL INSECURITY
WITHIN THE LAST YEAR, HAVE YOU BEEN KICKED, HIT, SLAPPED, OR OTHERWISE PHYSICALLY HURT BY YOUR PARTNER OR EX-PARTNER?: NO

## 2024-12-20 SDOH — SOCIAL STABILITY: SOCIAL INSECURITY
WITHIN THE LAST YEAR, HAVE YOU BEEN RAPED OR FORCED TO HAVE ANY KIND OF SEXUAL ACTIVITY BY YOUR PARTNER OR EX-PARTNER?: NO

## 2024-12-20 SDOH — SOCIAL STABILITY: SOCIAL INSECURITY: ARE YOU MARRIED, WIDOWED, DIVORCED, SEPARATED, NEVER MARRIED, OR LIVING WITH A PARTNER?: MARRIED

## 2024-12-20 SDOH — HEALTH STABILITY: MENTAL HEALTH: EXPERIENCED ANY OF THE FOLLOWING LIFE EVENTS: DEATH OF FAMILY/FRIEND

## 2024-12-20 SDOH — SOCIAL STABILITY: SOCIAL INSECURITY: WITHIN THE LAST YEAR, HAVE YOU BEEN AFRAID OF YOUR PARTNER OR EX-PARTNER?: NO

## 2024-12-20 SDOH — HEALTH STABILITY: MENTAL HEALTH
DO YOU FEEL STRESS - TENSE, RESTLESS, NERVOUS, OR ANXIOUS, OR UNABLE TO SLEEP AT NIGHT BECAUSE YOUR MIND IS TROUBLED ALL THE TIME - THESE DAYS?: NOT AT ALL

## 2024-12-20 SDOH — SOCIAL STABILITY: SOCIAL INSECURITY: ARE YOU OR HAVE YOU BEEN THREATENED OR ABUSED PHYSICALLY, EMOTIONALLY, OR SEXUALLY BY ANYONE?: NO

## 2024-12-20 SDOH — ECONOMIC STABILITY: HOUSING INSECURITY: IN THE PAST 12 MONTHS, HOW MANY TIMES HAVE YOU MOVED WHERE YOU WERE LIVING?: 0

## 2024-12-20 SDOH — SOCIAL STABILITY: SOCIAL NETWORK
IN A TYPICAL WEEK, HOW MANY TIMES DO YOU TALK ON THE PHONE WITH FAMILY, FRIENDS, OR NEIGHBORS?: MORE THAN THREE TIMES A WEEK

## 2024-12-20 SDOH — ECONOMIC STABILITY: HOUSING INSECURITY: IN THE LAST 12 MONTHS, WAS THERE A TIME WHEN YOU WERE NOT ABLE TO PAY THE MORTGAGE OR RENT ON TIME?: NO

## 2024-12-20 SDOH — SOCIAL STABILITY: SOCIAL INSECURITY: DO YOU FEEL ANYONE HAS EXPLOITED OR TAKEN ADVANTAGE OF YOU FINANCIALLY OR OF YOUR PERSONAL PROPERTY?: NO

## 2024-12-20 SDOH — ECONOMIC STABILITY: FOOD INSECURITY: WITHIN THE PAST 12 MONTHS, YOU WORRIED THAT YOUR FOOD WOULD RUN OUT BEFORE YOU GOT THE MONEY TO BUY MORE.: NEVER TRUE

## 2024-12-20 SDOH — SOCIAL STABILITY: SOCIAL NETWORK
DO YOU BELONG TO ANY CLUBS OR ORGANIZATIONS SUCH AS CHURCH GROUPS, UNIONS, FRATERNAL OR ATHLETIC GROUPS, OR SCHOOL GROUPS?: YES

## 2024-12-20 SDOH — SOCIAL STABILITY: SOCIAL INSECURITY: HAVE YOU HAD THOUGHTS OF HARMING ANYONE ELSE?: YES

## 2024-12-20 SDOH — SOCIAL STABILITY: SOCIAL NETWORK: HOW OFTEN DO YOU GET TOGETHER WITH FRIENDS OR RELATIVES?: ONCE A WEEK

## 2024-12-20 SDOH — HEALTH STABILITY: PHYSICAL HEALTH: ON AVERAGE, HOW MANY MINUTES DO YOU ENGAGE IN EXERCISE AT THIS LEVEL?: 40 MIN

## 2024-12-20 SDOH — SOCIAL STABILITY: SOCIAL NETWORK: HOW OFTEN DO YOU ATTEND CHURCH OR RELIGIOUS SERVICES?: NEVER

## 2024-12-20 SDOH — SOCIAL STABILITY: SOCIAL INSECURITY: POSSIBLE ABUSE REPORTED TO:: ADVOCATE

## 2024-12-20 SDOH — SOCIAL STABILITY: SOCIAL INSECURITY: HAS ANYONE EVER THREATENED TO HURT YOUR FAMILY OR YOUR PETS?: NO

## 2024-12-20 SDOH — ECONOMIC STABILITY: TRANSPORTATION INSECURITY: IN THE PAST 12 MONTHS, HAS LACK OF TRANSPORTATION KEPT YOU FROM MEDICAL APPOINTMENTS OR FROM GETTING MEDICATIONS?: NO

## 2024-12-20 SDOH — HEALTH STABILITY: PHYSICAL HEALTH: ON AVERAGE, HOW MANY DAYS PER WEEK DO YOU ENGAGE IN MODERATE TO STRENUOUS EXERCISE (LIKE A BRISK WALK)?: 3 DAYS

## 2024-12-20 SDOH — ECONOMIC STABILITY: FOOD INSECURITY: HOW HARD IS IT FOR YOU TO PAY FOR THE VERY BASICS LIKE FOOD, HOUSING, MEDICAL CARE, AND HEATING?: NOT HARD AT ALL

## 2024-12-20 SDOH — SOCIAL STABILITY: SOCIAL INSECURITY: ABUSE: ADULT

## 2024-12-20 SDOH — SOCIAL STABILITY: SOCIAL NETWORK: HOW OFTEN DO YOU ATTEND MEETINGS OF THE CLUBS OR ORGANIZATIONS YOU BELONG TO?: 1 TO 4 TIMES PER YEAR

## 2024-12-20 SDOH — ECONOMIC STABILITY: HOUSING INSECURITY: DO YOU FEEL UNSAFE GOING BACK TO THE PLACE WHERE YOU LIVE?: NO

## 2024-12-20 SDOH — SOCIAL STABILITY: SOCIAL INSECURITY: DOES ANYONE TRY TO KEEP YOU FROM HAVING/CONTACTING OTHER FRIENDS OR DOING THINGS OUTSIDE YOUR HOME?: NO

## 2024-12-20 SDOH — SOCIAL STABILITY: SOCIAL INSECURITY: DO YOU FEEL UNSAFE GOING BACK TO THE PLACE WHERE YOU ARE LIVING?: NO

## 2024-12-20 ASSESSMENT — ACTIVITIES OF DAILY LIVING (ADL)
GROOMING: INDEPENDENT
DRESSING YOURSELF: INDEPENDENT
BATHING: INDEPENDENT
LACK_OF_TRANSPORTATION: NO
ADEQUATE_TO_COMPLETE_ADL: YES
FEEDING YOURSELF: INDEPENDENT
LACK_OF_TRANSPORTATION: NO
JUDGMENT_ADEQUATE_SAFELY_COMPLETE_DAILY_ACTIVITIES: YES
HEARING - LEFT EAR: FUNCTIONAL
PATIENT'S MEMORY ADEQUATE TO SAFELY COMPLETE DAILY ACTIVITIES?: YES
WALKS IN HOME: INDEPENDENT
TOILETING: INDEPENDENT
HEARING - RIGHT EAR: FUNCTIONAL

## 2024-12-20 ASSESSMENT — COGNITIVE AND FUNCTIONAL STATUS - GENERAL
MOBILITY SCORE: 24
MOBILITY SCORE: 24
DAILY ACTIVITIY SCORE: 24

## 2024-12-20 ASSESSMENT — PATIENT HEALTH QUESTIONNAIRE - PHQ9
SUM OF ALL RESPONSES TO PHQ9 QUESTIONS 1 & 2: 0
1. LITTLE INTEREST OR PLEASURE IN DOING THINGS: NOT AT ALL
2. FEELING DOWN, DEPRESSED OR HOPELESS: NOT AT ALL

## 2024-12-20 ASSESSMENT — LIFESTYLE VARIABLES
SUBSTANCE_ABUSE_PAST_12_MONTHS: NO
AUDIT-C TOTAL SCORE: 1
HOW MANY STANDARD DRINKS CONTAINING ALCOHOL DO YOU HAVE ON A TYPICAL DAY: PATIENT DOES NOT DRINK
SKIP TO QUESTIONS 9-10: 0
PRESCIPTION_ABUSE_PAST_12_MONTHS: NO
HOW OFTEN DO YOU HAVE A DRINK CONTAINING ALCOHOL: NEVER
AUDIT-C TOTAL SCORE: 1
HOW OFTEN DO YOU HAVE 6 OR MORE DRINKS ON ONE OCCASION: LESS THAN MONTHLY

## 2024-12-20 ASSESSMENT — PAIN DESCRIPTION - LOCATION: LOCATION: ABDOMEN

## 2024-12-20 ASSESSMENT — PAIN SCALES - GENERAL
PAINLEVEL_OUTOF10: 2
PAINLEVEL_OUTOF10: 3
PAINLEVEL_OUTOF10: 2

## 2024-12-20 NOTE — CONSULTS
"NEPHROLOGY NEW CONSULT NOTE   Bria Brenner   44 y.o.    @WT@  MRN/Room: [id number]    Reason for consult: Donor nephrectomy    HPI:  Bria Brenner is a 44 y.o. female with no significant PMHx, underwent Left donor nephrectomy 12/19/2024.       In The ER: /71 (BP Location: Right arm, Patient Position: Lying)   Pulse 68   Temp 36.4 °C (97.5 °F) (Temporal)   Resp 18   Ht 1.651 m (5' 5\")   Wt 84.9 kg (187 lb 2.7 oz)   SpO2 97%   BMI 31.15 kg/m²      History reviewed. No pertinent past medical history.   Past Surgical History:   Procedure Laterality Date    CT ABDOMEN PELVIS ANGIOGRAM W AND/OR WO IV CONTRAST  12/27/2023    CT ABDOMEN PELVIS ANGIOGRAM W AND/OR WO IV CONTRAST 12/27/2023 [id number]      No family history on file.  Social History     Socioeconomic History    Marital status:      Spouse name: Not on file    Number of children: Not on file    Years of education: Not on file    Highest education level: Not on file   Occupational History    Not on file   Tobacco Use    Smoking status: Never     Passive exposure: Never    Smokeless tobacco: Never   Substance and Sexual Activity    Alcohol use: Not on file    Drug use: Not on file    Sexual activity: Not on file   Other Topics Concern    Not on file   Social History Narrative    Not on file     Social Drivers of Health     Financial Resource Strain: Low Risk  (12/20/2024)    Overall Financial Resource Strain (CARDIA)     Difficulty of Paying Living Expenses: Not hard at all   Food Insecurity: No Food Insecurity (7/18/2023)    [facility]    Hunger Vital Sign     Worried About Running Out of Food in the Last Year: Never true     Ran Out of Food in the Last Year: Never true   Transportation Needs: No Transportation Needs (12/20/2024)    PRAPARE - Transportation     Lack of Transportation (Medical): No     Lack of Transportation (Non-Medical): No   Physical Activity: Insufficiently " Active (7/18/2023)    Received from [facility]    Exercise Vital Sign     Days of Exercise per Week: 3 days     Minutes of Exercise per Session: 30 min   Stress: No Stress Concern Present (7/18/2023)    Received from [facility]    [employer] [place] [place] [employer] - Occupational Stress Questionnaire     Feeling of Stress : Only a little   Social Connections: Moderately Isolated (7/18/2023)    Received from [facility]    Social Connection and Isolation Panel [NHANES]     Frequency of Communication with Friends and Family: More than three times a week     Frequency of Social Gatherings with Friends and Family: Once a week     Attends [employer] Services: Never     Active Member of Clubs or Organizations: No     Attends Club or Organization Meetings: Never     Marital Status:    Intimate Partner Violence: Not on file   Housing Stability: Unknown (12/20/2024)    Housing Stability Vital Sign     Unable to Pay for Housing in the Last Year: No     Number of Times Moved in the Last Year: Not on file     Homeless in the Last Year: No       Allergies   Allergen Reactions    Benadryl Decongestant Hives    Claritin [Loratadine] Hives    Erythromycin Hives    Zyrtec [Cetirizine] Hives        Medications Prior to Admission   Medication Sig Dispense Refill Last Dose/Taking    montelukast (Singulair) 10 mg tablet Take 1 tablet (10 mg) by mouth once daily at bedtime.           Meds:   acetaminophen, 650 mg, q6h RIGOBERTO  ketorolac, 15 mg, q6h RIGOBERTO  montelukast, 10 mg, Nightly  polyethylene glycol, 17 g, Daily  sennosides, 2 tablet, BID         naloxone, 0.2 mg, q5 min PRN  ondansetron ODT, 4 mg, q8h PRN   Or  ondansetron, 4 mg, q8h PRN  oxyCODONE, 5 mg, q6h PRN  traMADol, 50 mg, q6h PRN        Vitals:    12/20/24 1538   BP: 109/71   Pulse: 68   Resp: 18   Temp: 36.4 °C (97.5 °F)   SpO2: 97%        12/18 1900 - 12/20 0659  In: 3892.5 [P.O.:150;  I.V.:1042.5]  Out: 2850 [Urine:2625]   Weight change: 0.6 kg (1 lb 5.2 oz)     Gen: A+OX3; NAD  HEENT: PERRL, sclera anicteric, MMM  Cardiac: RRR  Chest: Normal inspiratory effort  Abdomen: S/NT/ND. Incisions C/D/I.  Ext: No LE edema      ASSESSMENT:  Bria Brenner is a 44 y.o. female with no significant PMHx, underwent Left donor nephrectomy 12/19/2024.     ** Donor Nephrectomy  - 12/19/2024  - BL Cr 0.7  - Cr 1.1 following Nephrectomy  - UOP >2L      Recommendations:  - Pain Control  - Maintain Euvolemia  - Follow up KFTs  - Nephrology Follow up.    Timothy Freitas MD  Nephrology Fellow  24 hour Renal Pager - [phone]

## 2024-12-20 NOTE — PROGRESS NOTES
TCC met with patient at bedside (spouse present). Patient currently waiting for discharge paperwork, no current discharge needs. TCC will continue to follow for discharge planning.     12/20/24 5467   Discharge Planning   Living Arrangements Spouse/significant other   Support Systems Spouse/significant other   Assistance Needed None   Type of Residence Private residence   Do you have animals or pets at home? No   Home or Post Acute Services None   Expected Discharge Disposition Home   Does the patient need discharge transport arranged? No   Financial Resource Strain   How hard is it for you to pay for the very basics like food, housing, medical care, and heating? Not hard   Housing Stability   In the last 12 months, was there a time when you were not able to pay the mortgage or rent on time? N   At any time in the past 12 months, were you homeless or living in a shelter (including now)? N   Transportation Needs   In the past 12 months, has lack of transportation kept you from medical appointments or from getting medications? no   In the past 12 months, has lack of transportation kept you from meetings, work, or from getting things needed for daily living? No   Stroke Family Assessment   Stroke Family Assessment Needed No       JAKE Aguilera, RN  [facility], [facility]9  Transitional Care Coordinator, Mon-Fri  Cell: [phone], Office: [phone]  Email: [email or web address]

## 2024-12-20 NOTE — PROGRESS NOTES
Bria Brenner is a 44 y.o. female POD#1 from left donor nephrectomy.    Objective   Gen: A+OX3; NAD  HEENT: PERRL, sclera anicteric, MMM  Cardiac: RRR  Chest: Normal inspiratory effort  Abdomen: S/NT/ND. Incisions C/D/I.  Ext: No LE edema    Last Recorded Vitals  Visit Vitals  /69 (BP Location: Right arm, Patient Position: Lying)   Pulse 64   Temp 36.9 °C (98.4 °F) (Temporal)   Resp 16      Intake/Output last 3 Shifts:    Intake/Output Summary (Last 24 hours) at 12/20/2024 1433  Last data filed at 12/20/2024 0900  Gross per 24 hour   Intake 475 ml   Output 1090 ml   Net -615 ml      Vitals:    12/20/24 0459   Weight: 84.9 kg (187 lb 2.7 oz)   Scheduled medications  acetaminophen, 650 mg, oral, q6h RIGOBERTO  ketorolac, 15 mg, intravenous, q6h RIGOBERTO  montelukast, 10 mg, oral, Nightly  polyethylene glycol, 17 g, oral, Daily  sennosides, 2 tablet, oral, BID    Assessment/Plan   Principal Problem:    Kidney transplant recipient  Active Problems:  Patient Active Problem List   Diagnosis    Donor of kidney for transplant    Willing to be kidney donor    Kidney donor    PONV (postoperative nausea and vomiting)      - Voss removed  - HLIVF  - Regular diet  - Bowel regimen    I spent 35 minutes in the professional and overall care of this patient.    Kathy Riojas MD, PHD, MPH, FACS  Chief Transplant and Hepatobiliary Surgery

## 2024-12-20 NOTE — DISCHARGE SUMMARY
Discharge Diagnosis  Donor of kidney for transplant    Issues Requiring Follow-Up  Lap Donor Nephrectomy 12/19    Test Results Pending At Discharge  Pending Labs       Order Current Status    Type And Screen Collected (12/19/24 3430)            Hospital Course  Bria Brenner is a 44 year old female with no significant PMH who is now s/p laparoscopic left donor nephrectomy on 12/19/24 by Dr. Lawler . The surgery went well and was without complication. Post-operatively, the patient recovered and was transferred to the regular nursing floor for further recovery and management. The patient was tolerating a regular diet, maintaining adequate hydration with oral intake, ambulating independently, and had return of bowel function. Her pain was well controlled on oral agents and she was ready for discharge home with no home going needs on 12/20/24.  All access removed prior to discharge. Scripts filled and delivered bedside. The patient will f/u in the transplant institute.     Pertinent Physical Exam At Time of Discharge  Physical Exam  Vitals reviewed.   Constitutional:       Appearance: Normal appearance.   HENT:      Head: Normocephalic.   Cardiovascular:      Rate and Rhythm: Normal rate.      Pulses: Normal pulses.   Pulmonary:      Effort: Pulmonary effort is normal.   Abdominal:      Palpations: Abdomen is soft.   Musculoskeletal:         General: Normal range of motion.      Cervical back: Normal range of motion.   Skin:     General: Skin is warm and dry.   Neurological:      Mental Status: She is alert and oriented to person, place, and time.   Psychiatric:         Mood and Affect: Mood normal.         Home Medications     Medication List      START taking these medications     acetaminophen 325 mg tablet; Commonly known as: Tylenol; Take 2 tablets   (650 mg) by mouth every 6 hours for 15 doses.   docusate sodium 100 mg capsule; Commonly known as: Colace; Take 1   capsule (100 mg) by mouth 2 times a day as  needed for constipation for up   to 15 days.   polyethylene glycol 17 gram packet; Commonly known as: Glycolax,   Miralax; Take 17 g by mouth once daily as needed (constipation) for up to   5 days.   traMADol 50 mg tablet; Commonly known as: Ultram; Take 1 tablet (50 mg)   by mouth every 6 hours if needed for moderate pain (4 - 6) or severe pain   (7 - 10) for up to 5 days.     CONTINUE taking these medications     montelukast 10 mg tablet; Commonly known as: Singulair       Outpatient Follow-Up  Future Appointments   Date Time Provider Department Center   1/2/2025  9:30 AM TXP ABDOMINAL SURGEON [id number]       Gina Westbrook, APRN-CNP

## 2024-12-20 NOTE — CONSULTS
Nutrition Diet Education        Education Documentation  Nutrition Care Manual, taught by Tisha Paige RDN, LD at 12/20/2024 12:51 PM.  Learner: Patient  Readiness: Acceptance  Method: Explanation  Response: Verbalizes Understanding  Comment: Discussed general nutrition post surgery. Explained necessity of adequate intake especially protein + fluids. Encouraged supplement use if intake/appetite become poor. Pt says she feels well and has no questions or concerns at this time.          Follow Up:     Time Spent (min): 20 minutes  Last Date of Nutrition Visit: 12/20/24  Nutrition Follow-Up Needed?: Dietitian to reassess per policy  Follow up Comment: 20

## 2024-12-20 NOTE — PROGRESS NOTES
Pharmacist Post-Donation Discharge Note    Medications for Bria Brenner were reviewed in conjunction with the multidisciplinary transplant team. The pharmacist is in agreement with the plan of care for medications at this time.     The patient had her medications filled at [facility] and delivered prior to discharge. Further educational reinforcement should be provided in the outpatient clinic.    Dhiraj Selby, Jesi, BCPS, BCTXP  Solid Organ Transplant Clinical Pharmacy Specialist

## 2024-12-20 NOTE — PROGRESS NOTES
Saw Bria in her room on LT9. She had just had her gross removed. Bria said that she is doing well and was anxious to get up and get dressed for the day. She said that she was able to walk last evening and brushed her teeth. Her stated pain rating is at 2/10. We reviewed goals for today including ambulating in the halls at least 4 times, using her incentive spirometer hourly, taking in fluids and trying to eat some solids. Also reviewed that eating 5 or 6 small meals may be better than trying to eat a full meal. Told her that not having much of an appetite is normal for our donors. Will plan to see Bria again later today.

## 2024-12-20 NOTE — HOSPITAL COURSE
Bria Brenner is a 44 year old female with no significant PMH who is now s/p laparoscopic left donor nephrectomy on 12/19/24 by Dr. Lawler . The surgery went well and was without complication. Post-operatively, the patient recovered and was transferred to the regular nursing floor for further recovery and management. The patient was tolerating a regular diet, maintaining adequate hydration with oral intake, ambulating independently, and had return of bowel function. Her pain was well controlled on oral agents and she was ready for discharge home with no home going needs on 12/20/24.  All access removed prior to discharge. Scripts filled and delivered bedside. The patient will f/u in the transplant institute.

## 2024-12-20 NOTE — OP NOTE
Nephrectomy Donor Laparoscopic (L) Operative Note     Date: 2024  OR Location: [facility] OR    Name: Bria Brenner, : 1980, Age: 44 y.o., MRN: [id number], Sex: female    Diagnosis  Pre-op Diagnosis      * Donor of kidney for transplant [Z52.4] Post-op Diagnosis     * Donor of kidney for transplant [Z52.4]     Procedures  Nephrectomy Donor Laparoscopic  12580 - MI LAPAROSCOPY DONOR NEPHRECTOMY LIVING DONOR      Surgeons      * Rahul Lawler - Primary    Resident/Fellow/Other Assistant:  Surgeons and Role:     * Jean Carlos Vasquez MD - Assisting     * Latrice Del Valle MD - Resident - Assisting    Staff:   Circulator: Nuha Lynnub Person: Tona Mario Person: Chrissy  Circulator: Maria M    Anesthesia Staff: Anesthesiologist: Keshia Condon MD  C-AA: YVONNE Sloan; YVONNE Bunn  DARELL: Jaye Mauro    Procedure Summary  Anesthesia: General  ASA: ASA status not filed in the log.  Estimated Blood Loss: 20 mL  Intra-op Medications:   Administrations occurring from 0645 to 1205 on 24:   Medication Name Total Dose   sodium chloride 0.9 % irrigation solution 1,000 mL   bupivacaine PF 0.25 % (Marcaine) 0.25 % (2.5 mg/mL) injection 20 mL   sodium chloride 0.9% infusion 130 mL   aprepitant (Emend) capsule 40 mg 40 mg   methocarbamol (Robaxin) injection 1,000 mg 1,000 mg   oxygen (O2) therapy 120 L   scopolamine (Transderm-Scop) patch 1 patch 1 patch              Anesthesia Record               Intraprocedure I/O Totals          Intake    Dexmedetomidine 0.00 mL    The total shown is the total volume documented since Anesthesia Start was filed.    LR bolus 2700.00 mL    Total Intake 2700 mL       Output    Urine 310 mL    Est. Blood Loss 25 mL    Total Output 335 mL       Net    Net Volume 2365 mL          Specimen: No specimens collected              Drains and/or Catheters:   Urethral Catheter (Active)   Site Assessment Skin intact;Clean 24 1711   Collection Container Standard  drainage bag 12/19/24 1711   Securement Method Securing device (Describe) 12/19/24 1711   Reason for Continuing Urinary Catheterization surgical procedures: urological/gynecological, pelvic oncology, anal, prolonged surgical procedure 12/19/24 1711   Output (mL) 100 mL 12/19/24 1900       Findings: 2 arteries, 1 vein, 1 ureter      Procedure Details:   Following proper informed consent, Bria Brenner was taken to the operating room. She was placed supine on the hospital table.  Prior to anesthesia we confirmed that the blood type of the donor was compatible with a blood type of the intended recipient and documented this appropriately in the [facility] medical record.  In addition we confirmed the patient's UNOS identification number and that all serologies have been reviewed and were acceptable for transplantation.  After this patient was then anesthetized.    Following induction of general anesthesia a Voss catheter and nasogastric tube replaced.  The patient was placed in the right lateral decubitus position.  The abdomen was sterilely prepped and draped in surgical fashion.  All pressure points were carefully checked and confirmed to be appropriately padded.  The operation was then began with a proper timeout procedure.  We confirmed administration of antibiotics and antithrombotic devices.    We began with a left lower quadrant incision overlying the lateral border of the rectus muscle.  This was carried down using Bovie cautery.  We identified the lateral border of the rectus muscle and incised the external oblique fascia.  This was retracted laterally and we incised the internal oblique and transversalis muscle.  The peritoneum was sharply entered under direct vision.  The incision was extended proximally and distally.  We then placed 2 additional 12 mm subcostal port sites.  The abdomen was insufflated to 15 mmHg.    We began by taking down the left colon from the splenic flexure to the pelvic rim.   Identified the patient's left gonadal vein.  It was traced distally to the level of the pelvic rim and proximally to the left renal vein.  It was circumferentially dissected and divided between surgical clips.  It was then  from the ureter.  The ureter was identified, preserved, and dissected free of the retroperitoneum leaving a generous amount of tissue surrounding it.  We then identified the patient's left adrenal vein.  It was not divided at this time.  We mobilized the kidney from the retroperitoneum.  It was flipped and medially and the posterior aspect of the hilar structures were identified.  We then gently distracted the kidney inferiorly.  We divided the tissue between the adrenal gland and the superior pole of the kidney.  We then identified the left adrenal vein and it was circumferentially dissected and divided between surgical clips.  We then proceeded to free the remainder of the hilar structures.  A large lumbar vein was divided between clips.  The renal arteries were carefully dissected free to appropriate length.  The renal vein was dissected free proximal to the adrenal vein takeoff.  We then prepared to remove the kidney.    The ureter, arteries, and vein were divided with surgical staplers.  The kidney was then removed via the HandPort incision.  It was immediately passed off and flushed with preservation solution.  He was found to flush without preservation defects according to the transplanting surgery team.  We evaluated the retroperitoneum and confirmed hemostasis.  We evaluated the descending colonic mesentery and repaired any mesenteric defects with surgical clips.  Tisseel and Surgicel was used to ensure hemostasis at the hilum and reduce the risk of a lymphatic leak.    Once excellent hemostasis had been achieved the ports were removed under direct vision.  The subcostal ports were closed with a single 2-0 Vicryl stitch.  The HandPort incision was closed in 2 layers with 0 PDS  suture in a running fashion.  Subcutaneous tissues were reapproximated with 3-0 Vicryl.  All skin with incisions were closed with 4-0 Monocryl and surgical glue.  The patient was then awakened and taken to the recovery room in good condition.  There were no intraoperative complications.  I was present and participated in the entire to the above described procedure.        Attending Attestation: I was present and scrubbed for the entire procedure.    Rahul Lawler  Phone Number: [phone]

## 2024-12-20 NOTE — PROGRESS NOTES
"Ms. Brenner donated a kidney on 12/19/2024. SW reviewed the pre donation Social Work evaluation as well as interdisciplinary notes of this admission. Attended transplant interdisciplinary rounds. SW reviewed, participated and is in agreement with MDT Plan of Care. I met with patient who was awake, alert, oriented and able to discuss their condition.    Functional/Medical Status: SW met with Pt alone at the bedside. Pt was walking around the room during the visit. Pt reported she has been drinking well but has not starting eating yet. Pt shared she has been ambulating.   Adaption to the Stress of Donation: Pt denied any concerns related to donation at this time.   Mental Health/Substance use: Pt reported her mood as \"fine.\" Pt shared she is no longer engaged in MH counseling. SW offered MH resources, and Pt declined. Pt denied any recent tobacco, alcohol, or illicit drug use.   Post Discharge Supports/Recovery Plan: Pt reported her primary support remains her , Rahul, and her secondary support remains her friend, Tiarra. Pt shared her  will be picking her up and staying with her once home.   Benefits: Medical [place]    Impressions: SW met with Pt alone at the bedside. Pt was walking around the room during the visit. Pt reported she has been drinking well but has not starting eating yet. Pt shared she has been ambulating. Pt denied any concerns related to donation at this time. Pt reported her mood as \"fine.\" Pt shared she is no longer engaged in MH counseling. SW offered MH resources, and Pt declined. Pt denied any recent tobacco, alcohol, or illicit drug use. Pt reported her primary support remains her , Rahul, and her secondary support remains her friend, Tiarra. Pt shared her  will be picking her up and staying with her once home. Patient and family/support system are in agreement and report understanding of their treatment plan. They were provided an opportunity to ask questions, " though denied any issues or concerns at this time.        PLAN:  We reviewed the importance of having lab work done as directed; scheduled post-donation appointments; reporting alarming symptoms; restrictions of activities and importance of adherence to medical regimen. Patient indicated understanding of this information along with the discharge plan and instructions. Patient was given the opportunity to discuss any issues. Patient was given social work contact information.

## 2024-12-20 NOTE — PROGRESS NOTES
Saw Bria this afternoon in her room. She was sitting up in bed and said that she is feeling well. Bria said that had not yet voided since the catheter came out and hasn't really been drinking. She was provided with a fresh pitcher of water and encouraged to drink. If she is able to void adequately she will likely go home later today. In anticipation of discharge, we reviewed home going instructions including s/s of dehydration and when to call with a problem. Bria was provided with the on-call number to contact us after normal business hours. We also reviewed activity expectations and restrictions. We will see her back for a post-op visit on 1/2/2024.

## 2024-12-21 ENCOUNTER — TELEPHONE (OUTPATIENT)
Dept: TRANSPLANT | Facility: HOSPITAL | Age: 44
End: 2024-12-21

## 2024-12-21 NOTE — TELEPHONE ENCOUNTER
ON CALL:   Call placed to patient to see how feeling post kidney donation per primary coordinator request.   Patient states feels great, happy to be home. Confirmed she has on call number if needs anything over the weekend.

## 2025-01-02 ENCOUNTER — OFFICE VISIT (OUTPATIENT)
Facility: HOSPITAL | Age: 45
End: 2025-01-02
Payer: SUBSIDIZED

## 2025-01-02 ENCOUNTER — LAB (OUTPATIENT)
Dept: LAB | Facility: LAB | Age: 45
End: 2025-01-02
Payer: SUBSIDIZED

## 2025-01-02 ENCOUNTER — DOCUMENTATION (OUTPATIENT)
Dept: TRANSPLANT | Facility: HOSPITAL | Age: 45
End: 2025-01-02

## 2025-01-02 VITALS
TEMPERATURE: 97.7 F | SYSTOLIC BLOOD PRESSURE: 136 MMHG | HEART RATE: 89 BPM | BODY MASS INDEX: 30 KG/M2 | WEIGHT: 180.3 LBS | DIASTOLIC BLOOD PRESSURE: 90 MMHG | OXYGEN SATURATION: 99 %

## 2025-01-02 DIAGNOSIS — Z52.4 DONOR OF KIDNEY FOR TRANSPLANT: Primary | ICD-10-CM

## 2025-01-02 DIAGNOSIS — Z52.4 DONOR OF KIDNEY FOR TRANSPLANT: ICD-10-CM

## 2025-01-02 LAB
APPEARANCE UR: CLEAR
BILIRUB UR STRIP.AUTO-MCNC: NEGATIVE MG/DL
COLOR UR: NORMAL
CREAT SERPL-MCNC: 1.04 MG/DL (ref 0.5–1.05)
CREAT UR-MCNC: 80.1 MG/DL (ref 20–320)
EGFRCR SERPLBLD CKD-EPI 2021: 68 ML/MIN/1.73M*2
GLUCOSE UR STRIP.AUTO-MCNC: NORMAL MG/DL
HOLD SPECIMEN: NORMAL
KETONES UR STRIP.AUTO-MCNC: NEGATIVE MG/DL
LEUKOCYTE ESTERASE UR QL STRIP.AUTO: NEGATIVE
NITRITE UR QL STRIP.AUTO: NEGATIVE
PH UR STRIP.AUTO: 5.5 [PH]
PROT UR STRIP.AUTO-MCNC: NEGATIVE MG/DL
PROT UR-ACNC: 5 MG/DL (ref 5–24)
PROT/CREAT UR: 0.06 MG/MG CREAT (ref 0–0.17)
RBC # UR STRIP.AUTO: NEGATIVE /UL
SP GR UR STRIP.AUTO: 1.01
UROBILINOGEN UR STRIP.AUTO-MCNC: NORMAL MG/DL

## 2025-01-02 PROCEDURE — 82565 ASSAY OF CREATININE: CPT

## 2025-01-02 PROCEDURE — 82570 ASSAY OF URINE CREATININE: CPT

## 2025-01-02 PROCEDURE — 99211 OFF/OP EST MAY X REQ PHY/QHP: CPT

## 2025-01-02 PROCEDURE — 84156 ASSAY OF PROTEIN URINE: CPT

## 2025-01-02 PROCEDURE — 81003 URINALYSIS AUTO W/O SCOPE: CPT

## 2025-01-02 ASSESSMENT — ENCOUNTER SYMPTOMS
FATIGUE: 0
ABDOMINAL PAIN: 0
FEVER: 0

## 2025-01-02 ASSESSMENT — PAIN SCALES - GENERAL: PAINLEVEL_OUTOF10: 0-NO PAIN

## 2025-01-02 NOTE — PROGRESS NOTES
Bria was seen for her 2 week post-op check with Dr. Lawler. She denied any complaints. Bria states that she is eating, drinking and having BM's without difficulty. Bria said that she is feeling back to normal and requests to go back to work full-time next week. This was discussed with Dr. Lawler who released her as of 1/6/2025. She was instructed not to participate in any heavy lifting for the next month. Bria expressed understanding. She will get lab done today. We will contact her to set up her follow up visit in 6 months. I've asked her to contact us with any questions/concerns.

## 2025-01-02 NOTE — PROGRESS NOTES
Subjective   Bria Brenner is a 44 y.o. female who underwent livind donor nephrectomy on 12/19/2024 (Kidney). Here today for follow-up. She feels well. No complaints.     Review of Systems   Constitutional:  Negative for fatigue and fever.   Cardiovascular:  Negative for leg swelling.   Gastrointestinal:  Negative for abdominal pain.        Objective   /90   Pulse 89   Temp 36.5 °C (97.7 °F) (Temporal)   Wt 81.8 kg (180 lb 4.8 oz)   SpO2 99%   BMI 30.00 kg/m²     Physical Exam  Constitutional:       Appearance: Normal appearance. She is normal weight.   Cardiovascular:      Rate and Rhythm: Normal rate.   Pulmonary:      Effort: Pulmonary effort is normal.   Abdominal:      Palpations: Abdomen is soft.      Comments: Incisions c/d/i   Musculoskeletal:         General: Normal range of motion.   Skin:     General: Skin is warm and dry.   Neurological:      Mental Status: She is alert and oriented to person, place, and time.       Lab Results   Component Value Date    CREATININE 1.15 (H) 12/20/2024    K 3.8 12/20/2024    GLUCOSE 81 12/20/2024    HCT 40.0 12/20/2024    WBC 10.2 12/20/2024     12/20/2024    CALCIUM 8.9 12/20/2024     Assessment/Plan   S/p left hand assisted donor nephrectomy.  No complications. Feeling well.     Plan for follow up in 6,12,24 months.   Tylenol PRN for discomfort  Approved to return to work    Rahul Lawler MD

## 2025-06-09 ENCOUNTER — TELEPHONE (OUTPATIENT)
Facility: HOSPITAL | Age: 45
End: 2025-06-09
Payer: COMMERCIAL

## 2025-07-08 DIAGNOSIS — Z52.4 DONOR OF KIDNEY FOR TRANSPLANT: ICD-10-CM

## 2025-07-10 ENCOUNTER — SOCIAL WORK (OUTPATIENT)
Facility: HOSPITAL | Age: 45
End: 2025-07-10
Payer: SUBSIDIZED

## 2025-07-10 ENCOUNTER — OFFICE VISIT (OUTPATIENT)
Facility: HOSPITAL | Age: 45
End: 2025-07-10
Payer: SUBSIDIZED

## 2025-07-10 ENCOUNTER — APPOINTMENT (OUTPATIENT)
Facility: HOSPITAL | Age: 45
End: 2025-07-10
Payer: SUBSIDIZED

## 2025-07-10 ENCOUNTER — NURSE ONLY (OUTPATIENT)
Facility: HOSPITAL | Age: 45
End: 2025-07-10
Payer: SUBSIDIZED

## 2025-07-10 VITALS
OXYGEN SATURATION: 98 % | HEART RATE: 83 BPM | DIASTOLIC BLOOD PRESSURE: 84 MMHG | SYSTOLIC BLOOD PRESSURE: 126 MMHG | WEIGHT: 178 LBS | BODY MASS INDEX: 29.62 KG/M2 | TEMPERATURE: 97.3 F

## 2025-07-10 DIAGNOSIS — Z90.5 S/P NEPHRECTOMY: ICD-10-CM

## 2025-07-10 DIAGNOSIS — Z52.4 DONOR OF KIDNEY FOR TRANSPLANT: Primary | ICD-10-CM

## 2025-07-10 LAB
APPEARANCE UR: CLEAR
BILIRUB UR STRIP.AUTO-MCNC: NEGATIVE MG/DL
COLOR UR: NORMAL
CREAT UR-MCNC: 59.5 MG/DL (ref 20–320)
GLUCOSE UR STRIP.AUTO-MCNC: NORMAL MG/DL
KETONES UR STRIP.AUTO-MCNC: NEGATIVE MG/DL
LEUKOCYTE ESTERASE UR QL STRIP.AUTO: NEGATIVE
NITRITE UR QL STRIP.AUTO: NEGATIVE
PH UR STRIP.AUTO: 6 [PH]
PROT UR STRIP.AUTO-MCNC: NEGATIVE MG/DL
PROT UR-ACNC: 5 MG/DL (ref 5–24)
PROT/CREAT UR: 0.08 MG/MG CREAT (ref 0–0.17)
RBC # UR STRIP.AUTO: NEGATIVE MG/DL
SP GR UR STRIP.AUTO: 1.01
UROBILINOGEN UR STRIP.AUTO-MCNC: NORMAL MG/DL

## 2025-07-10 PROCEDURE — 84156 ASSAY OF PROTEIN URINE: CPT | Performed by: HOSPITALIST

## 2025-07-10 PROCEDURE — 99213 OFFICE O/P EST LOW 20 MIN: CPT | Performed by: INTERNAL MEDICINE

## 2025-07-10 PROCEDURE — 81003 URINALYSIS AUTO W/O SCOPE: CPT | Performed by: HOSPITALIST

## 2025-07-10 ASSESSMENT — PAIN SCALES - GENERAL: PAINLEVEL_OUTOF10: 0-NO PAIN

## 2025-07-10 ASSESSMENT — PATIENT HEALTH QUESTIONNAIRE - PHQ9
1. LITTLE INTEREST OR PLEASURE IN DOING THINGS: NOT AT ALL
2. FEELING DOWN, DEPRESSED OR HOPELESS: NOT AT ALL
SUM OF ALL RESPONSES TO PHQ9 QUESTIONS 1 & 2: 0

## 2025-07-10 ASSESSMENT — SOCIAL DETERMINANTS OF HEALTH (SDOH): HOW HARD IS IT FOR YOU TO PAY FOR THE VERY BASICS LIKE FOOD, HOUSING, MEDICAL CARE, AND HEATING?: NOT HARD AT ALL

## 2025-07-10 NOTE — PROGRESS NOTES
TRANSPLANT NEPHROLOGY :   KIDNEY DONOR FOLLOW UP CLINIC NOTE        SERVICE DATE: 07/10/2025    SERVICE TIME:  1:45 PM    REASON FOR VISIT/CHIEF COMPLAINT:  S/P DONOR NEPHRECTOMY FOLLOW UP    HPI:    Ms. Brenner is a 45 y.o. female who had been healthy without significant past medical history significant and underwent kidney donor nephrectomy on 12/19/24.     Patient is here for follow up s/p donor nephrectomy per protocol.    Patient is doing well overall. No new complaints. 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 We discussed repeating CMV and check A1C and she agreed with the plan above.    Denied chest pain, SOB, AVILA, Palpitation. Normal urination and bowel movement. Normal gait and no weakness of arms/legs. No cough, runny nose, sore throat, cold symptoms, or rash. No hearing loss. Normal vision.No problems with his sleep, mood and function. No recent infection, hospitalization, surgery or ER visits.      ROS:  Review of  14 systems was performed system by system. See HPI. Otherwise, the symptoms were negative.    PAST MEDICAL HISTORY:  Medical History[1]     PAST SURGICAL HISTORY:  Surgical History[2]     SOCIAL HISTORY:  Social History     Socioeconomic History    Marital status:      Spouse name: Not on file    Number of children: Not on file    Years of education: Not on file    Highest education level: Not on file   Occupational History    Not on file   Tobacco Use    Smoking status: Never     Passive exposure: Never    Smokeless tobacco: Never   Substance and Sexual Activity    Alcohol use: Not on file    Drug use: Not on file    Sexual activity: Not on file   Other Topics Concern    Not on file   Social History Narrative    Not on file     Social Drivers of Health     Financial Resource Strain: Low Risk  (7/10/2025)    Overall Financial Resource Strain (CARDIA)     Difficulty of Paying Living Expenses: Not hard at all   Food Insecurity: No  Food Insecurity (2/14/2025)    Received from [facility]    Hunger Vital Sign     Worried About Running Out of Food in the Last Year: Never true     Ran Out of Food in the Last Year: Never true   Transportation Needs: No Transportation Needs (2/14/2025)    Received from [facility]    PRAPARE - Transportation     Lack of Transportation (Medical): No     Lack of Transportation (Non-Medical): No   Physical Activity: Sufficiently Active (2/14/2025)    Received from [facility]    Exercise Vital Sign     Days of Exercise per Week: 4 days     Minutes of Exercise per Session: 40 min   Recent Concern: Physical Activity - Insufficiently Active (12/20/2024)    Exercise Vital Sign     Days of Exercise per Week: 3 days     Minutes of Exercise per Session: 40 min   Stress: No Stress Concern Present (2/14/2025)    Received from [facility]    Citizen of the [place] of Occupational Health - Occupational Stress Questionnaire     Feeling of Stress : Not at all   Social Connections: Unknown (2/14/2025)    Received from [facility]    Social Connection and Isolation Panel [NHANES]     Frequency of Communication with Friends and Family: Patient declined     Frequency of Social Gatherings with Friends and Family: Patient declined     Attends Buddhist Services: Patient declined     Active Member of Clubs or Organizations: Patient declined     Attends Club or Organization Meetings: Patient declined     Marital Status:    Intimate Partner Violence: Not At Risk (12/20/2024)    Humiliation, Afraid, Rape, and Kick questionnaire     Fear of Current or Ex-Partner: No     Emotionally Abused: No     Physically Abused: No     Sexually Abused: No   Housing Stability: Low Risk  (12/20/2024)    Housing Stability Vital Sign     Unable to Pay for Housing in the Last Year: No     Number of Times Moved in the Last Year: 0     Homeless in the Last Year: No       FAMILY HISTORY:  Family History[3]    MEDICATION LIST:  Current  Outpatient Medications   Medication Instructions    montelukast (SINGULAIR) 10 mg, Nightly       ALLERGY  Allergies[4]    PHYSICAL EXAM:    Visit Vitals  /84   Pulse 83   Temp 36.3 °C (97.3 °F) (Temporal)   Wt 80.7 kg (178 lb)   SpO2 98%   BMI 29.62 kg/m²   OB Status Having periods   Smoking Status Never   BSA 1.92 m²          Vital signs - reviewed. Acceptable BP at this office visit.   General Appearance - NAD, Good speech, oriented and alert  HEENT - Supple. Not pale. No jaundice. No cervical lymphadenopathy. Pharynx and tonsils are not injected.  CVS - RRR. Normal S1/S2. No murmur, click , rub or gallop  Lungs- clear to auscultation bilaterally  Abdomen - soft , not tender, no guarding, no rigidity. No hepatosplenomegaly. Normal bowel sounds. No masses and ascites. S/P donor nephrectomy.  Musculoskeletal /Extremities - no edema. Full ROM. No joint tenderness.   Neuro/Psych - appropriate mood and affect. Motor power V/V all extremities. CN I -XII were grossly intact.  Skin - No visible rash      LABS:    Lab Results   Component Value Date    WBC 10.2 12/20/2024    HGB 13.9 12/20/2024    HCT 40.0 12/20/2024     12/20/2024    CHOL 170 11/17/2023    TRIG 55 11/17/2023    HDL 61.4 11/17/2023    ALT 32 12/04/2024    AST 21 12/04/2024     12/20/2024    K 3.8 12/20/2024     12/20/2024    CREATININE 1.04 01/02/2025    BUN 16 12/20/2024    CO2 24 12/20/2024    INR 1.0 12/04/2024    GLUF 88 11/17/2023    HGBA1C 4.8 11/17/2023    ALBUR <7.0 11/17/2023     par    ASSESSMENT AND PLAN:    Patient is here for follow up s/p donor nephrectomy per protocol.    1. Kidney donor nephrectomy  Lab Results   Component Value Date    CREATININE 1.04 01/02/2025     - Ensure adequate hydration  -Avoid nephrotoxic agents, NSAIDs, IV contrast    2. Electrolytes  Lab Results   Component Value Date    GLUCOSE 81 12/20/2024    CALCIUM 8.9 12/20/2024     12/20/2024    K 3.8 12/20/2024    CO2 24 12/20/2024      12/20/2024    BUN 16 12/20/2024    CREATININE 1.04 01/02/2025     -Acceptable from last lab drawn    3. Blood pressure  Blood Pressures         7/10/2025  1258             BP: 126/84          -Home BP had been acceptable  -Encourage to monitor home BP randomly  -No indications for BP meds.    4. Anemia  Lab Results   Component Value Date    WBC 10.2 12/20/2024    HGB 13.9 12/20/2024    HCT 40.0 12/20/2024    MCV 91 12/20/2024     12/20/2024     - Monitor hemoglobin    5.Health maintenance   Lab Results   Component Value Date    HGBA1C 4.8 11/17/2023     - Flu shot during flu season annually  - Cancer screening is up to date per the patient  - Lipid panel / A1C with next lab for screening    RTC with labs per protocol.    Follow up CMP and A1C    Ginna Nettles    Transplant Nephrologist               [1] No past medical history on file.  [2]   Past Surgical History:  Procedure Laterality Date    CT ABDOMEN PELVIS ANGIOGRAM W AND/OR WO IV CONTRAST  12/27/2023    CT ABDOMEN PELVIS ANGIOGRAM W AND/OR WO IV CONTRAST 12/27/2023 [facility] [place] [id number] CT   [3] No family history on file.  [4]   Allergies  Allergen Reactions    Benadryl Decongestant Hives    Claritin [Loratadine] Hives    Erythromycin Hives    Zyrtec [Cetirizine] Hives

## 2025-07-10 NOTE — PROGRESS NOTES
SW met with kidney donor alone for 6 month FU post-donation. Pt presented pleasant and engaged. Pt denied any current post-donation recovery or medical issues. Pt denied any current financial issues and shared she has returned back to work. 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 Pt denied any current MH concerns or treatments in place. SW offered psychosocial resources, pt declined need at this time. Pt denied any further post-donation FU questions or concerns for SW. SW encouraged donor to notify SW if any needs arise relating.       Plan: SW will remain available.

## 2025-07-11 LAB — HOLD SPECIMEN: NORMAL

## (undated) DEVICE — APPLICATOR, CHLORAPREP, W/ORANGE TINT, 26ML

## (undated) DEVICE — INSTRUMENT, DISSECTING, ENDOSCOPIC, PEANUT, 5 MM, STERILE

## (undated) DEVICE — DRAPE, INSTRUMENT, W/POUCH, STERI DRAPE, 9 5/8 X 18 LONG

## (undated) DEVICE — ADHESIVE, SKIN, DERMABOND ADVANCED, 15CM, PEN-STYLE

## (undated) DEVICE — SLEEVE, SURGICAL, 21.5 X 5.5 IN, LF, STERILE

## (undated) DEVICE — TUBE, FEEDING, INFANT, 8 FR, 20IN

## (undated) DEVICE — APPLIER,  LIGACLIP, ENDO ROTATE, ROTATING, 10MM 20M/L, DISP

## (undated) DEVICE — Device

## (undated) DEVICE — SUTURE, VICRYL, 3-0, 27 IN, SH

## (undated) DEVICE — CATHETER TRAY, SURESTEP, 16FR, URINE METER W/STATLOCK

## (undated) DEVICE — TUBE, SALEM SUMP, 16 FR X 48IN, ENFIT

## (undated) DEVICE — SUTURE, PROLENE, 1 CTX 30IN MONO, BLUE

## (undated) DEVICE — DRAPE, INSTRUMENT, W/POUCH, STERI DRAPE, 7 X 11 IN, DISPOSABLE, STERILE

## (undated) DEVICE — CARE KIT, LAPAROSCOPIC, ADVANCED

## (undated) DEVICE — SOLUTION, BELZER UW COLD STORAGE P4055

## (undated) DEVICE — PAD, GROUNDING, ELECTROSURGICAL, W/9 FT CABLE, POLYHESIVE II, ADULT, LF

## (undated) DEVICE — HOLSTER, ELECTROSURGERY ACCESSORY, STERILE

## (undated) DEVICE — SEALANT KIT, TISSEEL VH FIBRIN, 10ML

## (undated) DEVICE — SYRINGE, 60 CC, LUER LOCK, MONOJECT, W/O CAP, LF

## (undated) DEVICE — STAPLER, ECHELON, 35 VASCULAR FLEX POWERED

## (undated) DEVICE — SUTURE, MONOCRYL, 4-0, 18 IN, PS2, UNDYED

## (undated) DEVICE — STAPLER, ENDO ECHELON 45MM RELOAD, WHITE, REUSABLE

## (undated) DEVICE — KIT, APPLICATOR, DUPLOSPRAY, 30CM

## (undated) DEVICE — SYSTEM, SMOKE EVACUATION LAPAROSCOPIC SEECLEAR MAX

## (undated) DEVICE — CLIPPER, SURGICAL BLADE ASSEMBLY, GENERAL PURPOSE, SINGLE USE

## (undated) DEVICE — RELOAD, ECHELON, VASCULAR WHITE ENDOPATH 35MM FLEX POWERED

## (undated) DEVICE — COVER, CART, 45 X 27 X 48 IN, CLEAR

## (undated) DEVICE — DEVICE, HAND ACCESS, GELPORT, 120MM

## (undated) DEVICE — TUBE SET, PNEUMOCLEAR, SMOKE EVACU, HIGH-FLOW

## (undated) DEVICE — TOWEL, SURGICAL, NEURO, O/R, 16 X 26, BLUE, STERILE

## (undated) DEVICE — ASSEMBLY, STRYKER FLOW 2, SUCTION IRRIGATOR, WITH TIP

## (undated) DEVICE — ACCESS PORT, 12MM, 100MM LENGTH, LOW PROFILE W/BLADELESS OPTICAL TIP

## (undated) DEVICE — GOWN, SURGICAL, SMARTGOWN, LARGE, STERILE

## (undated) DEVICE — NEEDLE, HYPODERMIC, REGULAR WALL, REGULAR BEVEL, 22 G X 1 IN

## (undated) DEVICE — COVER, TABLE, UHC

## (undated) DEVICE — COVER, EQUIPMENT, SOLUTION, SLUSH, LF, 122CM X 122 CM, STERILE

## (undated) DEVICE — MANIFOLD, 4 PORT NEPTUNE STANDARD

## (undated) DEVICE — SOLUTION, IRRIGATION, USP, SODIUM CHLORIDE 0.9%, 3000 ML, BAG

## (undated) DEVICE — DRAPE, SHEET, ENDOSCOPY, GENERAL, FENESTRATED, ARMBOARD COVER, 98 X 123.5 IN, DISPOSABLE, LF, STERILE